# Patient Record
Sex: MALE | Race: WHITE | NOT HISPANIC OR LATINO | Employment: OTHER | URBAN - METROPOLITAN AREA
[De-identification: names, ages, dates, MRNs, and addresses within clinical notes are randomized per-mention and may not be internally consistent; named-entity substitution may affect disease eponyms.]

---

## 2017-09-11 ENCOUNTER — APPOINTMENT (OUTPATIENT)
Dept: GENERAL RADIOLOGY | Facility: HOSPITAL | Age: 77
End: 2017-09-11

## 2017-09-11 ENCOUNTER — APPOINTMENT (OUTPATIENT)
Dept: CT IMAGING | Facility: HOSPITAL | Age: 77
End: 2017-09-11

## 2017-09-11 ENCOUNTER — HOSPITAL ENCOUNTER (INPATIENT)
Facility: HOSPITAL | Age: 77
LOS: 2 days | Discharge: NURSING, REHAB OR LONG-TERM CARE | End: 2017-09-14
Attending: EMERGENCY MEDICINE | Admitting: FAMILY MEDICINE

## 2017-09-11 DIAGNOSIS — R79.1 ELEVATED INR: ICD-10-CM

## 2017-09-11 DIAGNOSIS — N13.30 BILATERAL HYDRONEPHROSIS: ICD-10-CM

## 2017-09-11 DIAGNOSIS — Z74.09 IMPAIRED MOBILITY AND ADLS: ICD-10-CM

## 2017-09-11 DIAGNOSIS — N32.0 BLADDER OUTLET OBSTRUCTION: ICD-10-CM

## 2017-09-11 DIAGNOSIS — Z74.09 IMPAIRED FUNCTIONAL MOBILITY, BALANCE, GAIT, AND ENDURANCE: ICD-10-CM

## 2017-09-11 DIAGNOSIS — R62.7 ADULT FAILURE TO THRIVE SYNDROME: ICD-10-CM

## 2017-09-11 DIAGNOSIS — Z78.9 IMPAIRED MOBILITY AND ADLS: ICD-10-CM

## 2017-09-11 DIAGNOSIS — N17.9 ACUTE RENAL FAILURE, UNSPECIFIED ACUTE RENAL FAILURE TYPE (HCC): Primary | ICD-10-CM

## 2017-09-11 PROBLEM — N13.9 OBSTRUCTIVE UROPATHY: Status: ACTIVE | Noted: 2017-09-11

## 2017-09-11 PROBLEM — D75.839 THROMBOCYTOSIS: Status: ACTIVE | Noted: 2017-09-11

## 2017-09-11 PROBLEM — R70.0 ELEVATED SED RATE: Status: ACTIVE | Noted: 2017-09-11

## 2017-09-11 PROBLEM — R79.89 ELEVATED LFTS: Status: ACTIVE | Noted: 2017-09-11

## 2017-09-11 LAB
ALBUMIN SERPL-MCNC: 3.6 G/DL (ref 3.2–4.8)
ALBUMIN/GLOB SERPL: 0.9 G/DL (ref 1.5–2.5)
ALP SERPL-CCNC: 80 U/L (ref 25–100)
ALT SERPL W P-5'-P-CCNC: 76 U/L (ref 7–40)
ANION GAP SERPL CALCULATED.3IONS-SCNC: 8 MMOL/L (ref 3–11)
AST SERPL-CCNC: 47 U/L (ref 0–33)
BACTERIA UR QL AUTO: ABNORMAL /HPF
BASOPHILS # BLD AUTO: 0.06 10*3/MM3 (ref 0–0.2)
BASOPHILS NFR BLD AUTO: 0.6 % (ref 0–1)
BILIRUB SERPL-MCNC: 0.4 MG/DL (ref 0.3–1.2)
BILIRUB UR QL STRIP: NEGATIVE
BUN BLD-MCNC: 26 MG/DL (ref 9–23)
BUN/CREAT SERPL: 17.3 (ref 7–25)
CALCIUM SPEC-SCNC: 9.6 MG/DL (ref 8.7–10.4)
CHLORIDE SERPL-SCNC: 107 MMOL/L (ref 99–109)
CLARITY UR: CLEAR
CO2 SERPL-SCNC: 27 MMOL/L (ref 20–31)
COLOR UR: YELLOW
CREAT BLD-MCNC: 1.5 MG/DL (ref 0.6–1.3)
CRP SERPL-MCNC: 1.58 MG/DL (ref 0–1)
D-LACTATE SERPL-SCNC: 0.9 MMOL/L (ref 0.5–2)
DEPRECATED RDW RBC AUTO: 48.2 FL (ref 37–54)
DEVELOPER EXPIRATION DATE: NORMAL
DEVELOPER LOT NUMBER: NORMAL
EOSINOPHIL # BLD AUTO: 0.21 10*3/MM3 (ref 0–0.3)
EOSINOPHIL NFR BLD AUTO: 2.2 % (ref 0–3)
ERYTHROCYTE [DISTWIDTH] IN BLOOD BY AUTOMATED COUNT: 14.3 % (ref 11.3–14.5)
ERYTHROCYTE [SEDIMENTATION RATE] IN BLOOD: 76 MM/HR (ref 0–20)
EXPIRATION DATE: NORMAL
FECAL OCCULT BLOOD SCREEN, POC: NEGATIVE
GFR SERPL CREATININE-BSD FRML MDRD: 46 ML/MIN/1.73
GLOBULIN UR ELPH-MCNC: 4.1 GM/DL
GLUCOSE BLD-MCNC: 111 MG/DL (ref 70–100)
GLUCOSE UR STRIP-MCNC: NEGATIVE MG/DL
HCT VFR BLD AUTO: 43.6 % (ref 38.9–50.9)
HGB BLD-MCNC: 14.3 G/DL (ref 13.1–17.5)
HGB UR QL STRIP.AUTO: ABNORMAL
HYALINE CASTS UR QL AUTO: ABNORMAL /LPF
IMM GRANULOCYTES # BLD: 0.1 10*3/MM3 (ref 0–0.03)
IMM GRANULOCYTES NFR BLD: 1.1 % (ref 0–0.6)
INR PPP: 5.22
KETONES UR QL STRIP: NEGATIVE
LEUKOCYTE ESTERASE UR QL STRIP.AUTO: ABNORMAL
LYMPHOCYTES # BLD AUTO: 1.63 10*3/MM3 (ref 0.6–4.8)
LYMPHOCYTES NFR BLD AUTO: 17.5 % (ref 24–44)
Lab: NORMAL
MAGNESIUM SERPL-MCNC: 2.3 MG/DL (ref 1.3–2.7)
MCH RBC QN AUTO: 30 PG (ref 27–31)
MCHC RBC AUTO-ENTMCNC: 32.8 G/DL (ref 32–36)
MCV RBC AUTO: 91.4 FL (ref 80–99)
MONOCYTES # BLD AUTO: 0.87 10*3/MM3 (ref 0–1)
MONOCYTES NFR BLD AUTO: 9.3 % (ref 0–12)
NEGATIVE CONTROL: NEGATIVE
NEUTROPHILS # BLD AUTO: 6.47 10*3/MM3 (ref 1.5–8.3)
NEUTROPHILS NFR BLD AUTO: 69.3 % (ref 41–71)
NITRITE UR QL STRIP: NEGATIVE
PH UR STRIP.AUTO: 6.5 [PH] (ref 5–8)
PLATELET # BLD AUTO: 491 10*3/MM3 (ref 150–450)
PMV BLD AUTO: 9.9 FL (ref 6–12)
POSITIVE CONTROL: POSITIVE
POTASSIUM BLD-SCNC: 4.2 MMOL/L (ref 3.5–5.5)
PROCALCITONIN SERPL-MCNC: <0.05 NG/ML
PROT SERPL-MCNC: 7.7 G/DL (ref 5.7–8.2)
PROT UR QL STRIP: NEGATIVE
PROTHROMBIN TIME: 59.9 SECONDS (ref 9.6–11.5)
RBC # BLD AUTO: 4.77 10*6/MM3 (ref 4.2–5.76)
RBC # UR: ABNORMAL /HPF
REF LAB TEST METHOD: ABNORMAL
SODIUM BLD-SCNC: 142 MMOL/L (ref 132–146)
SP GR UR STRIP: 1.01 (ref 1–1.03)
SQUAMOUS #/AREA URNS HPF: ABNORMAL /HPF
UROBILINOGEN UR QL STRIP: ABNORMAL
WBC NRBC COR # BLD: 9.34 10*3/MM3 (ref 3.5–10.8)
WBC UR QL AUTO: ABNORMAL /HPF

## 2017-09-11 PROCEDURE — G0378 HOSPITAL OBSERVATION PER HR: HCPCS

## 2017-09-11 PROCEDURE — 84145 PROCALCITONIN (PCT): CPT | Performed by: EMERGENCY MEDICINE

## 2017-09-11 PROCEDURE — 93005 ELECTROCARDIOGRAM TRACING: CPT | Performed by: EMERGENCY MEDICINE

## 2017-09-11 PROCEDURE — 99219 PR INITIAL OBSERVATION CARE/DAY 50 MINUTES: CPT | Performed by: NURSE PRACTITIONER

## 2017-09-11 PROCEDURE — 83605 ASSAY OF LACTIC ACID: CPT | Performed by: EMERGENCY MEDICINE

## 2017-09-11 PROCEDURE — 25010000002 MORPHINE PER 10 MG: Performed by: FAMILY MEDICINE

## 2017-09-11 PROCEDURE — 25010000002 VITAMIN K1 1 MG/1 ML SOLUTION: Performed by: NURSE PRACTITIONER

## 2017-09-11 PROCEDURE — 83735 ASSAY OF MAGNESIUM: CPT | Performed by: EMERGENCY MEDICINE

## 2017-09-11 PROCEDURE — 71010 HC CHEST PA OR AP: CPT

## 2017-09-11 PROCEDURE — 87040 BLOOD CULTURE FOR BACTERIA: CPT | Performed by: EMERGENCY MEDICINE

## 2017-09-11 PROCEDURE — 25010000002 HYDROMORPHONE PER 4 MG: Performed by: EMERGENCY MEDICINE

## 2017-09-11 PROCEDURE — 74176 CT ABD & PELVIS W/O CONTRAST: CPT

## 2017-09-11 PROCEDURE — 81001 URINALYSIS AUTO W/SCOPE: CPT | Performed by: EMERGENCY MEDICINE

## 2017-09-11 PROCEDURE — 85610 PROTHROMBIN TIME: CPT | Performed by: EMERGENCY MEDICINE

## 2017-09-11 PROCEDURE — 99284 EMERGENCY DEPT VISIT MOD MDM: CPT

## 2017-09-11 PROCEDURE — 80053 COMPREHEN METABOLIC PANEL: CPT | Performed by: EMERGENCY MEDICINE

## 2017-09-11 PROCEDURE — 25010000003 CEFTRIAXONE PER 250 MG: Performed by: NURSE PRACTITIONER

## 2017-09-11 PROCEDURE — 86140 C-REACTIVE PROTEIN: CPT | Performed by: EMERGENCY MEDICINE

## 2017-09-11 PROCEDURE — 85025 COMPLETE CBC W/AUTO DIFF WBC: CPT | Performed by: EMERGENCY MEDICINE

## 2017-09-11 PROCEDURE — 85652 RBC SED RATE AUTOMATED: CPT | Performed by: EMERGENCY MEDICINE

## 2017-09-11 PROCEDURE — 87086 URINE CULTURE/COLONY COUNT: CPT | Performed by: EMERGENCY MEDICINE

## 2017-09-11 RX ORDER — TAMSULOSIN HYDROCHLORIDE 0.4 MG/1
0.4 CAPSULE ORAL NIGHTLY
COMMUNITY

## 2017-09-11 RX ORDER — TAMSULOSIN HYDROCHLORIDE 0.4 MG/1
0.4 CAPSULE ORAL NIGHTLY
Status: DISCONTINUED | OUTPATIENT
Start: 2017-09-11 | End: 2017-09-14 | Stop reason: HOSPADM

## 2017-09-11 RX ORDER — SODIUM CHLORIDE 0.9 % (FLUSH) 0.9 %
1-10 SYRINGE (ML) INJECTION AS NEEDED
Status: DISCONTINUED | OUTPATIENT
Start: 2017-09-11 | End: 2017-09-14 | Stop reason: HOSPADM

## 2017-09-11 RX ORDER — CEFTRIAXONE SODIUM 2 G/50ML
2 INJECTION, SOLUTION INTRAVENOUS EVERY 24 HOURS
Status: DISCONTINUED | OUTPATIENT
Start: 2017-09-11 | End: 2017-09-14 | Stop reason: HOSPADM

## 2017-09-11 RX ORDER — ONDANSETRON 2 MG/ML
4 INJECTION INTRAMUSCULAR; INTRAVENOUS EVERY 6 HOURS PRN
Status: DISCONTINUED | OUTPATIENT
Start: 2017-09-11 | End: 2017-09-14 | Stop reason: HOSPADM

## 2017-09-11 RX ORDER — MORPHINE SULFATE 4 MG/ML
4 INJECTION, SOLUTION INTRAMUSCULAR; INTRAVENOUS EVERY 4 HOURS PRN
Status: DISPENSED | OUTPATIENT
Start: 2017-09-11 | End: 2017-09-12

## 2017-09-11 RX ORDER — ONDANSETRON 4 MG/1
4 TABLET, FILM COATED ORAL EVERY 6 HOURS PRN
Status: DISCONTINUED | OUTPATIENT
Start: 2017-09-11 | End: 2017-09-14 | Stop reason: HOSPADM

## 2017-09-11 RX ORDER — SODIUM CHLORIDE 9 MG/ML
125 INJECTION, SOLUTION INTRAVENOUS CONTINUOUS
Status: DISCONTINUED | OUTPATIENT
Start: 2017-09-11 | End: 2017-09-13

## 2017-09-11 RX ORDER — HYDROMORPHONE HYDROCHLORIDE 1 MG/ML
0.5 INJECTION, SOLUTION INTRAMUSCULAR; INTRAVENOUS; SUBCUTANEOUS ONCE
Status: COMPLETED | OUTPATIENT
Start: 2017-09-11 | End: 2017-09-11

## 2017-09-11 RX ORDER — SODIUM CHLORIDE 0.9 % (FLUSH) 0.9 %
10 SYRINGE (ML) INJECTION AS NEEDED
Status: DISCONTINUED | OUTPATIENT
Start: 2017-09-11 | End: 2017-09-14 | Stop reason: HOSPADM

## 2017-09-11 RX ORDER — WARFARIN SODIUM 4 MG/1
4 TABLET ORAL DAILY
COMMUNITY

## 2017-09-11 RX ORDER — PHYTONADIONE 2 MG/ML
5 INJECTION, EMULSION INTRAMUSCULAR; INTRAVENOUS; SUBCUTANEOUS ONCE
Status: COMPLETED | OUTPATIENT
Start: 2017-09-11 | End: 2017-09-11

## 2017-09-11 RX ADMIN — PHYTONADIONE 5 MG: 1 INJECTION, EMULSION INTRAMUSCULAR; INTRAVENOUS; SUBCUTANEOUS at 20:38

## 2017-09-11 RX ADMIN — SODIUM CHLORIDE 125 ML/HR: 9 INJECTION, SOLUTION INTRAVENOUS at 13:29

## 2017-09-11 RX ADMIN — CEFTRIAXONE SODIUM 2 G: 2 INJECTION, SOLUTION INTRAVENOUS at 21:44

## 2017-09-11 RX ADMIN — SODIUM CHLORIDE 500 ML: 9 INJECTION, SOLUTION INTRAVENOUS at 12:24

## 2017-09-11 RX ADMIN — LIDOCAINE HYDROCHLORIDE: 20 JELLY TOPICAL at 17:30

## 2017-09-11 RX ADMIN — MORPHINE SULFATE 4 MG: 4 INJECTION, SOLUTION INTRAMUSCULAR; INTRAVENOUS at 23:11

## 2017-09-11 RX ADMIN — HYDROMORPHONE HYDROCHLORIDE 0.5 MG: 1 INJECTION, SOLUTION INTRAMUSCULAR; INTRAVENOUS; SUBCUTANEOUS at 17:18

## 2017-09-11 RX ADMIN — TAMSULOSIN HYDROCHLORIDE 0.4 MG: 0.4 CAPSULE ORAL at 21:44

## 2017-09-11 NOTE — ED PROVIDER NOTES
Subjective   HPI Comments: Arthur Tran is a 77 y.o.male who is a poor ambulator with difficulty waking. He has a chronic sore on his foot which he sees a podiatrist for regularly. He uses a buggy if he goes out anywhere. He has history of prostatitis, enlarged prostate, epididymidis, UTIs, pulmonary embolus, and hypertension. He is on coumadin as a result of his previous blood clots. He presents to the ED with c/o rectal drainage which onset 4 days ago. He reports he was seen by his PCP, Dr. Gomez about one week ago with the complaint of dark odorous urine. He was prescribed Cipro 500 mg BID and is currently on day 6 of the course. He notes his symptoms have improved with the treatment. However, he noticed he was passing purulent drainage from his rectum 4 days ago. The drainage has remained pretty persistent since. He denies experiencing any pain during bowel movements. He did a home enema without any improvement in his symptoms. He has had chills and pain when attempting to start urination but he denies fevers, bloody stool, hematuria, testicular pain, or any other complaints at this time. His last colonoscopy was 5 years ago. He reports a few nodules were removed as a result, otherwise it was negative for findings.               Patient is a 77 y.o. male presenting with general illness.   History provided by:  Patient  Illness   Location:  Rectum  Quality:  Purulent drainage   Severity:  Moderate  Onset quality:  Sudden  Duration:  4 days  Timing:  Constant  Progression:  Unchanged  Chronicity:  New  Context:  He reports he was seen by his PCP, Dr. Gomez about one week ago with the complaint of dark odorous urine. He was prescribed Cipro 500 mg BID and is currently on day 6 of the course. He notes his symptoms have improved with the treatment. However, he noticed he was passing purulent drainage from his rectum 4 days ago. The drainage has remained pretty persistent since.  Relieved by:  Nothing  Worsened  by:  Nothing  Ineffective treatments:  Home enema   Associated symptoms: no diarrhea and no fever        Review of Systems   Constitutional: Positive for chills. Negative for fever.   Gastrointestinal: Negative for anal bleeding, blood in stool, constipation, diarrhea and rectal pain.        Purulent drainage from his rectum    Genitourinary: Positive for dysuria (when attempting to start urination ). Negative for hematuria and testicular pain.        Resolved dark odorous urine.    All other systems reviewed and are negative.      Past Medical History:   Diagnosis Date   • BPH (benign prostatic hyperplasia)    • CHF (congestive heart failure)    • CKD (chronic kidney disease)    • DVT (deep venous thrombosis)    • Hypertension    • PE (pulmonary thromboembolism)    • Prostatitis    11:12 AM  Old records reviewed. He was seen here in February 2016 with folly catheter issues. Treated for a UTI at that time. History of pulmonary embolus, aneurysm, epididymitis, enlarged prostate, and hypertension. Last admitted in 2015 with epididymal orchitis of the left testicle. IVC filter placed in the past.     Allergies   Allergen Reactions   • Bactrim [Sulfamethoxazole-Trimethoprim]    • Tetracyclines & Related        Past Surgical History:   Procedure Laterality Date   • RHINOPLASTY     • TONSILLECTOMY AND ADENOIDECTOMY      as child       Family History   Problem Relation Age of Onset   • Lymphoma Mother    • Kidney disease Father    • Aneurysm Sister    • Asthma Maternal Grandfather        Social History     Social History   • Marital status:      Spouse name: N/A   • Number of children: N/A   • Years of education: N/A     Occupational History   • Retired officer      Social History Main Topics   • Smoking status: Former Smoker   • Smokeless tobacco: None   • Alcohol use No   • Drug use: No   • Sexual activity: Defer     Other Topics Concern   • None     Social History Narrative    Lives alone in [place]    No  assistive devices used    Not active with home health         Objective   Physical Exam   Constitutional: He is oriented to person, place, and time. He appears well-developed and well-nourished. No distress.   HENT:   Head: Normocephalic and atraumatic.   Nose: Nose normal.   Poor dentition.    Eyes: Conjunctivae are normal. No scleral icterus.   Neck: Normal range of motion. Neck supple.   Cardiovascular: Normal rate, regular rhythm and normal heart sounds.    No murmur heard.  Pulmonary/Chest: Effort normal and breath sounds normal. No respiratory distress.   Abdominal: Soft. Bowel sounds are normal. He exhibits no mass. There is no tenderness. There is no rebound and no guarding.   Obese. No organomegaly.    Genitourinary: Rectal exam shows guaiac positive stool (trace positive secretions ).   Genitourinary Comments: Testes descended bilaterally. No herniation or tenderness. Uncircumcised. No pus extracted form the meatus. Perineum normal. Kraig purulence coming from the anus, which did not appear to be overtly WBC mediated pus. No evidence of anal fissure or external hemorrhoids that I could see. Slightly decreased rectal tone. 2+ prostate foggy and tender. No stool in the rectal vault. Secretions were mildly guaiac positive.    Musculoskeletal: Normal range of motion. He exhibits no edema.   Upper extremities normal. Lower extremities had compression stockings and fair toe hygiene without breakdown. 2/4 pulses. High arches bilaterally.   Neurological: He is alert and oriented to person, place, and time.   Mild generalized weakness.    Skin: Skin is warm and dry.   Psychiatric: He has a normal mood and affect. His behavior is normal.   Nursing note and vitals reviewed.      Procedures         ED Course  ED Course     Recent Results (from the past 24 hour(s))   Blood Culture    Collection Time: 09/11/17 11:50 AM   Result Value Ref Range    Blood Culture No growth at less than 24 hours    Comprehensive Metabolic  Panel    Collection Time: 09/11/17 11:52 AM   Result Value Ref Range    Glucose 111 (H) 70 - 100 mg/dL    BUN 26 (H) 9 - 23 mg/dL    Creatinine 1.50 (H) 0.60 - 1.30 mg/dL    Sodium 142 132 - 146 mmol/L    Potassium 4.2 3.5 - 5.5 mmol/L    Chloride 107 99 - 109 mmol/L    CO2 27.0 20.0 - 31.0 mmol/L    Calcium 9.6 8.7 - 10.4 mg/dL    Total Protein 7.7 5.7 - 8.2 g/dL    Albumin 3.60 3.20 - 4.80 g/dL    ALT (SGPT) 76 (H) 7 - 40 U/L    AST (SGOT) 47 (H) 0 - 33 U/L    Alkaline Phosphatase 80 25 - 100 U/L    Total Bilirubin 0.4 0.3 - 1.2 mg/dL    eGFR Non African Amer 46 (L) >60 mL/min/1.73    Globulin 4.1 gm/dL    A/G Ratio 0.9 (L) 1.5 - 2.5 g/dL    BUN/Creatinine Ratio 17.3 7.0 - 25.0    Anion Gap 8.0 3.0 - 11.0 mmol/L   Protime-INR    Collection Time: 09/11/17 11:52 AM   Result Value Ref Range    Protime 59.9 (C) 9.6 - 11.5 Seconds    INR 5.22    CBC Auto Differential    Collection Time: 09/11/17 11:52 AM   Result Value Ref Range    WBC 9.34 3.50 - 10.80 10*3/mm3    RBC 4.77 4.20 - 5.76 10*6/mm3    Hemoglobin 14.3 13.1 - 17.5 g/dL    Hematocrit 43.6 38.9 - 50.9 %    MCV 91.4 80.0 - 99.0 fL    MCH 30.0 27.0 - 31.0 pg    MCHC 32.8 32.0 - 36.0 g/dL    RDW 14.3 11.3 - 14.5 %    RDW-SD 48.2 37.0 - 54.0 fl    MPV 9.9 6.0 - 12.0 fL    Platelets 491 (H) 150 - 450 10*3/mm3    Neutrophil % 69.3 41.0 - 71.0 %    Lymphocyte % 17.5 (L) 24.0 - 44.0 %    Monocyte % 9.3 0.0 - 12.0 %    Eosinophil % 2.2 0.0 - 3.0 %    Basophil % 0.6 0.0 - 1.0 %    Immature Grans % 1.1 (H) 0.0 - 0.6 %    Neutrophils, Absolute 6.47 1.50 - 8.30 10*3/mm3    Lymphocytes, Absolute 1.63 0.60 - 4.80 10*3/mm3    Monocytes, Absolute 0.87 0.00 - 1.00 10*3/mm3    Eosinophils, Absolute 0.21 0.00 - 0.30 10*3/mm3    Basophils, Absolute 0.06 0.00 - 0.20 10*3/mm3    Immature Grans, Absolute 0.10 (H) 0.00 - 0.03 10*3/mm3   C-reactive Protein    Collection Time: 09/11/17 11:52 AM   Result Value Ref Range    C-Reactive Protein 1.58 (H) 0.00 - 1.00 mg/dL   Sedimentation  Rate    Collection Time: 09/11/17 11:52 AM   Result Value Ref Range    Sed Rate 76 (H) 0 - 20 mm/hr   Magnesium    Collection Time: 09/11/17 11:52 AM   Result Value Ref Range    Magnesium 2.3 1.3 - 2.7 mg/dL   Procalcitonin    Collection Time: 09/11/17 11:52 AM   Result Value Ref Range    Procalcitonin <0.05 ng/mL   Lactic Acid, Plasma    Collection Time: 09/11/17 11:52 AM   Result Value Ref Range    Lactate 0.9 0.5 - 2.0 mmol/L   Blood Culture    Collection Time: 09/11/17 11:55 AM   Result Value Ref Range    Blood Culture No growth at less than 24 hours    POCT Occult Blood, stool    Collection Time: 09/11/17 11:57 AM   Result Value Ref Range    Fecal Occult Blood Negative Negative    Lot Number [id number]     Expiration Date 5/2020     DEVELOPER LOT NUMBER [id number]     DEVELOPER EXPIRATION DATE 5/2020     Positive Control Positive Positive    Negative Control Negative Negative   Urinalysis With / Culture If Indicated    Collection Time: 09/11/17 12:31 PM   Result Value Ref Range    Color, UA Yellow Yellow, Straw    Appearance, UA Clear Clear    pH, UA 6.5 5.0 - 8.0    Specific Gravity, UA 1.011 1.001 - 1.030    Glucose, UA Negative Negative    Ketones, UA Negative Negative    Bilirubin, UA Negative Negative    Blood, UA Small (1+) (A) Negative    Protein, UA Negative Negative    Leuk Esterase, UA Small (1+) (A) Negative    Nitrite, UA Negative Negative    Urobilinogen, UA 0.2 E.U./dL 0.2 - 1.0 E.U./dL   Urinalysis, Microscopic Only    Collection Time: 09/11/17 12:31 PM   Result Value Ref Range    RBC, UA 3-6 (A) None Seen, 0-2 /HPF    WBC, UA 6-12 (A) None Seen /HPF    Bacteria, UA None Seen None Seen, Trace /HPF    Squamous Epithelial Cells, UA 0-2 None Seen, 0-2 /HPF    Hyaline Casts, UA 0-6 0 - 6 /LPF    Methodology Automated Microscopy    Urine Culture    Collection Time: 09/11/17 12:31 PM   Result Value Ref Range    Urine Culture Culture in progress      Note: In addition to lab results from this visit, the labs  "listed above may include labs taken at another facility or during a different encounter within the last 24 hours. Please correlate lab times with ED admission and discharge times for further clarification of the services performed during this visit.    CT Abdomen Pelvis Without Contrast   Final Result   1.  New moderate obstruction of the kidneys bilaterally as well as the   ureters to the bladder. No obstructing lesion identified with   irregularity identified of the prostate. Bladder outlet obstruction   cannot be excluded.   2. Contrast seen within the rectum with no definite evidence of   extravasation or fistulization. Clinical correlation is needed.   3. Ventral abdominal wall hernia containing mesenteric fat only.       D:  09/11/2017   E:  09/11/2017       This report was finalized on 9/11/2017 4:04 PM by Dr. Inna West MD.          XR Chest 1 View   Final Result   No acute cardiopulmonary disease.       D:  09/11/2017   E:  09/11/2017       This report was finalized on 9/11/2017 4:03 PM by Dr. Inna West MD.            Vitals:    09/11/17 1850 09/12/17 0000 09/12/17 0400 09/12/17 0803   BP: 154/86 133/71 134/71 111/72   BP Location: Right arm Right arm Left arm Right arm   Patient Position: Lying Lying Lying Sitting   Pulse: 76 70 68 87   Resp: 20 18 20 18   Temp: 97.7 °F (36.5 °C) 98.2 °F (36.8 °C) 97.4 °F (36.3 °C) 98.1 °F (36.7 °C)   TempSrc: Oral Oral Oral Oral   SpO2: 95% 96% 95%    Weight: (!) 305 lb 8 oz (139 kg)      Height: 75.5\" (191.8 cm)        Medications   sodium chloride 0.9 % flush 10 mL (not administered)   sodium chloride 0.9 % infusion (125 mL/hr Intravenous Currently Infusing 9/11/17 2155)   tamsulosin (FLOMAX) 24 hr capsule 0.4 mg (0.4 mg Oral Given 9/11/17 2144)   sodium chloride 0.9 % flush 1-10 mL (not administered)   ondansetron (ZOFRAN) tablet 4 mg (not administered)     Or   ondansetron (ZOFRAN) injection 4 mg (not administered)   Pharmacy Consult - Pharmacy to " dose (not administered)   warfarin (COUMADIN) (dosing per levels) ( Does not apply Hold 9/11/17 2037)   cefTRIAXone (ROCEPHIN) IVPB 2 g (2 g Intravenous New Bag 9/11/17 2144)   Morphine sulfate (PF) injection 4 mg (4 mg Intravenous Given 9/12/17 0334)   sodium chloride 0.9 % bolus 500 mL (0 mL Intravenous Stopped 9/11/17 1315)   HYDROmorphone (DILAUDID) injection 0.5 mg (0.5 mg Intravenous Given 9/11/17 1718)   lidocaine (XYLOCAINE) 2 % jelly ( Topical Given 9/11/17 1730)   vitamin K1 (PHYTONADIONE) 1 MG/1 ML oral solution 5 mg (5 mg Oral Given 9/11/17 2038)     ECG/EMG Results (last 24 hours)     Procedure Component Value Units Date/Time    ECG 12 Lead [[id number]] Collected:  09/11/17 1208     Updated:  09/11/17 1217        Procedure attempted Cummins placement.    Nursing staff could not place Cummins catheter.    The patient's uncircumcised.  Patient's penis was prepped and draped in normal sterile fashion and I instilled 10 mL's of 2% lidocaine jelly and his urethra.  This attempted it was sterilely place a 14 French coudé catheter without success.  Patient tolerated procedure well he had no bleeding.    I spoke Dr. Mckinney, on-call urology, he'll see the patient              MDM  Number of Diagnoses or Management Options  Acute renal failure, unspecified acute renal failure type:   Adult failure to thrive syndrome:   Bilateral hydronephrosis:   Elevated INR:   Diagnosis management comments:       I reviewed all available studies at the bedside with the patient.  Anna has acute renal insufficiency with hydronephrosis and bladder outlet obstruction.  Is able to pass a small amount of urine here is post void residual was in excess of 200 mL's.    He feels too weak to go home and his INR is supratherapeutic and I think he is a fall risk.    I've attempted to place a Cummins catheter and the patient was unable to do so.  I've call Dr. Mckinney, urology, to see the patient with Cummins catheter placement.    This point I think  "the best course of action is to admit the patient the hospital for observation to get his renal function improved his INR down to a therapeutic level and see what resources we can get for him at home as it sounds like he has adult failure to thrive as well.  He has children but apparently they're not involved in his life and he lives by himself.  He feels he is losing the ability to care for himself.    The etiology of this \"pus\" that he is passing from his rectum is unclear to me is his rectal contrasted CT shows no abnormality.  He does have some secretions from his rectum but no blood no abscess that I can either see or feel worse and CAT scans of breath just excessive anal glands secretions.    I've a call to Dr. Yusuf, on-call hospital medicine, to admit the patient.    All are agreeable with plan       Amount and/or Complexity of Data Reviewed  Clinical lab tests: reviewed  Tests in the radiology section of CPT®: reviewed  Tests in the medicine section of CPT®: reviewed  Decide to obtain previous medical records or to obtain history from someone other than the patient: yes        Final diagnoses:   Acute renal failure, unspecified acute renal failure type   Bilateral hydronephrosis   Elevated INR   Adult failure to thrive syndrome   Bladder outlet obstruction   EMR Dragon/Transcription disclaimer:   Much of this encounter note is an electronic transcription/translation of spoken language to printed text. The electronic translation of spoken language may permit erroneous, or at times, nonsensical words or phrases to be inadvertently transcribed; Although I have reviewed the note for such errors, some may still exist.       Documentation assistance provided by marcia Johnson.  Information recorded by the marcai was done at my direction and has been verified and validated by me.     Nevin Johnson  09/11/17 1149       Nevin Johnson  09/11/17 1550       León Blount MD  09/12/17 0856    "

## 2017-09-11 NOTE — CONSULTS
Assessment   77 y.o. male with acute on chronic urinary retention due to benign prostatic hypertrophy.  HARJINDER Galvez has been seen by myself and my partner Dr. Boyle in the past for BPH symptoms.  He was previously set up for cystoscopic evaluation did not show well.  He now states a 1 week history of difficulty urinating, urinary straining, voiding small amounts and malodorous urine.  The emergency room was unable to pass a Cummins catheter and urology is consulted for Cummins placement.  CT scan was performed in the emergency room.  It showed a moderately distended bladder and bilateral hydronephrosis which is new. Cr is 1.5, WBC is 9.3.     Plan   I was unable to pass a 16 Norwegian coudé catheter and was able to pass a 4 Norwegian spiral filiform and I then dilated the urethra with serial followers from 10 Norwegian to 20 Norwegian.  A 16 Norwegian coudé catheter was then placed with some resistance around the bulbar urethra.  There was significant resistance to passage of the 16,18 and 20 Norwegian followers.  Patient did tolerate procedure well.  After Cummins was placed 650 cc of concentrated urine was obtained.  Recommend continuing Cummins catheter until patient returns to the office with myself.    Subjective   Arthur Tran is a 77 y.o. male who was admitted for Acute renal failure, unspecified acute renal failure type [N17.9]. Patient was referred by ER for evaluation and treatment of urinary retention that began 1 week ago. Patient currently has a urinary catheter in place.  650 cc mL of urine was drained when catheter was placed. Prior to this event, voiding symptoms consisted of slow stream, hesitancy, intermittency, urgency. Prior treatments include tamulosin. Recent medications that may have affected voiding include none. Patient has not had recent constipation. Patient has had no surgeries.    Patient admits to history of previous retention and no risk factors for cancer. Patient denies history of trauma. Patient has had no  prior workup.    Outside records reviewed. Pertinent radiology and labs reviewed.    Review of Systems  Constitutional: negative  Respiratory: negative  Gastrointestinal: positive for diarrhea  Genitourinary:positive for decreased stream, hesitancy and nocturia  Musculoskeletal:positive for back pain     Objective   Physicial Exam  General appearance: alert, appears stated age and cooperative  Head: Normocephalic, without obvious abnormality, atraumatic  Neck: no JVD, supple, symmetrical, trachea midline and thyroid not enlarged, symmetric, no tenderness/mass/nodules  Back: symmetric, no curvature. ROM normal. No CVA tenderness.  Lungs: clear to auscultation bilaterally  Male genitalia: normal, normal findings: normal uncircumcised penis    Imaging  CT Abdomen: reviewed    Labs  Urine Microscopy:   Results from last 7 days  Lab Units 09/11/17  1231   RBC UA /HPF 3-6*   WBC UA /HPF 6-12*   , BMP:   Results from last 7 days  Lab Units 09/11/17  1152   SODIUM mmol/L 142   POTASSIUM mmol/L 4.2   CALCIUM mg/dL 9.6   CHLORIDE mmol/L 107   CO2 mmol/L 27.0   BUN mg/dL 26*   CREATININE mg/dL 1.50*   ALBUMIN g/dL 3.60   BILIRUBIN mg/dL 0.4   ALK PHOS U/L 80    and CBC:   Results from last 7 days  Lab Units 09/11/17  1152   WBC 10*3/mm3 9.34   RBC 10*6/mm3 4.77   HEMOGLOBIN g/dL 14.3   HEMATOCRIT % 43.6   PLATELETS 10*3/mm3 491*

## 2017-09-11 NOTE — H&P
[facility] Medicine Services  HISTORY AND PHYSICAL    Primary Care Physician: Edgardo Gomez MD    Subjective     Chief Complaint:  UTI/rectal drainage    History of Present Illness:   76 yo male who lives alone presents with rectal drainage for last 4-5 days that patient reports looks like pus.  Currently being treated for UTI with cipro and symptoms of dysuria have improved.  However, urine output has decreased in last several days and he feels run down.        Review of Systems   Constitutional: Positive for activity change and chills. Negative for diaphoresis and fever.   HENT: Negative for hearing loss.    Eyes: Negative for visual disturbance.   Respiratory: Positive for shortness of breath. Negative for cough.    Cardiovascular: Negative for chest pain.   Gastrointestinal: Positive for diarrhea and nausea. Negative for constipation and vomiting.   Genitourinary: Positive for dysuria. Negative for hematuria.   Skin: Positive for wound.   Neurological: Negative for headaches.   Psychiatric/Behavioral: Negative for confusion.        Past Medical History:   Diagnosis Date   • BPH (benign prostatic hyperplasia)    • CHF (congestive heart failure)    • CKD (chronic kidney disease)    • DVT (deep venous thrombosis)    • Hypertension    • PE (pulmonary thromboembolism)    • Prostatitis        Past Surgical History:   Procedure Laterality Date   • RHINOPLASTY     • TONSILLECTOMY AND ADENOIDECTOMY      as child       Family History   Problem Relation Age of Onset   • Lymphoma Mother    • Kidney disease Father    • Aneurysm Sister    • Asthma Maternal Grandfather        Social History     Social History   • Marital status:      Spouse name: N/A   • Number of children: N/A   • Years of education: N/A     Occupational History   • Retired officer      Social History Main Topics   • Smoking status: Former Smoker   • Smokeless tobacco: Not on file   • Alcohol use No   • Drug use: No   •  "Sexual activity: Defer     Other Topics Concern   • Not on file     Social History Narrative    Lives alone in [place]    No assistive devices used    Not active with home health       Medications:  Prescriptions Prior to Admission   Medication Sig Dispense Refill Last Dose   • BuPROPion HCl (WELLBUTRIN PO) Take  by mouth 2 (Two) Times a Day.      • LISINOPRIL PO Take  by mouth.      • tamsulosin (FLOMAX) 0.4 MG capsule 24 hr capsule Take 1 capsule by mouth Every Night.      • warfarin (COUMADIN) 4 MG tablet Take 4 mg by mouth Daily.          Allergies:  Allergies   Allergen Reactions   • Bactrim [Sulfamethoxazole-Trimethoprim]    • Tetracyclines & Related          Objective     Physical Exam:  Vital Signs: /80  Pulse 72  Temp 98.4 °F (36.9 °C) (Oral)   Resp 18  Ht 77\" (195.6 cm)  Wt (!) 317 lb (144 kg)  SpO2 96%  BMI 37.59 kg/m2  Physical Exam   Constitutional: He is oriented to person, place, and time. He appears well-developed and well-nourished. No distress.   HENT:   Head: Normocephalic.   POOR DENTITION   Eyes: No scleral icterus.   Neck: Neck supple.   Cardiovascular: Normal rate and regular rhythm.  Exam reveals no gallop and no friction rub.    No murmur heard.  Pulmonary/Chest: Effort normal and breath sounds normal. No respiratory distress. He has no wheezes.   Abdominal: Soft. Bowel sounds are normal. He exhibits distension. There is no tenderness. There is no guarding.   Musculoskeletal: He exhibits no edema.   Neurological: He is alert and oriented to person, place, and time.   Skin: Skin is warm and dry.   Psychiatric: He has a normal mood and affect.     Results Reviewed:    Results from last 7 days  Lab Units 09/11/17  1152   WBC 10*3/mm3 9.34   HEMOGLOBIN g/dL 14.3   PLATELETS 10*3/mm3 491*       Results from last 7 days  Lab Units 09/11/17  1152   SODIUM mmol/L 142   POTASSIUM mmol/L 4.2   CO2 mmol/L 27.0   CREATININE mg/dL 1.50*   GLUCOSE mg/dL 111*   CALCIUM mg/dL 9.6     Imaging " Results (last 24 hours)     Procedure Component Value Units Date/Time    XR Chest 1 View [[id number]] Collected:  09/11/17 1327     Updated:  09/11/17 1605    Narrative:       EXAMINATION: XR CHEST 1 VW- 09/11/2017     INDICATION: chills      COMPARISON: NONE     FINDINGS: Portable chest reveals heart to be enlarged. Calcified  granuloma identified left lung base. Underlying chronic changes seen  within the lung fields bilaterally. Degenerative changes seen within the  spine. Vascular calcifications identified.           Impression:       No acute cardiopulmonary disease.     D:  09/11/2017  E:  09/11/2017     This report was finalized on 9/11/2017 4:03 PM by Dr. Inna West MD.       CT Abdomen Pelvis Without Contrast [[id number]] Collected:  09/11/17 1533     Updated:  09/11/17 1606    Narrative:       EXAMINATION: CT ABDOMEN PELVIS WO CONTRAST- 09/11/2017     INDICATION: Rule out fistula     TECHNIQUE: Multiple axial CT imaging was obtained of the abdomen and  pelvis from the lung bases through the pubic symphysis following the  administration of rectal contrast.     The radiation dose reduction device was turned on for each scan per the  ALARA (As Low as Reasonably Achievable) protocol.     COMPARISON: 01/24/2016     FINDINGS:      ABDOMEN: The lung bases are grossly clear. Calcified granuloma  identified within the right lower lobe. Liver is homogeneous in  appearance with no stones seen in the gallbladder. Moderate dilatation  identified of the renal collecting systems bilaterally as well as both  ureters. There is mild distention of the bladder. Irregularity  identified within the prostate. Bladder outlet obstruction cannot be  completely excluded. Clinical correlation is needed. There is rectal  contrast seen within the rectum. No abnormal mass or fluid collections  identified. No definite fistula identified. There is some air seen in  the rectal vault. Spleen is unremarkable. The distal colon  is  unremarkable. Vascular calcifications seen within the abdominal aorta  and iliac vessels. Small infrarenal abdominal aortic aneurysm stable and  unchanged. No free fluid or free air.     PELVIS: The pelvic organs are unremarkable. The pelvic portion of the  gastrointestinal tract again reveal mild heterogeneous enlargement of  the prostate with some calcification. There is distal ureteral  distention. Small ventral abdominal wall hernia containing mesenteric  fat only. No free fluid or free air. No abnormal mass or fluid  collections identified. The bony structures are unremarkable.       Impression:       1.  New moderate obstruction of the kidneys bilaterally as well as the  ureters to the bladder. No obstructing lesion identified with  irregularity identified of the prostate. Bladder outlet obstruction  cannot be excluded.  2. Contrast seen within the rectum with no definite evidence of  extravasation or fistulization. Clinical correlation is needed.  3. Ventral abdominal wall hernia containing mesenteric fat only.     D:  09/11/2017  E:  09/11/2017     This report was finalized on 9/11/2017 4:04 PM by Dr. Inna West MD.               Assessment / Plan     Problem List:   Hospital Problem List     * (Principal)Acute renal failure    Obstructive uropathy    Supratherapeutic INR    DVT (deep venous thrombosis)    PE (pulmonary thromboembolism)    Hypertension    CKD (chronic kidney disease)    BPH (benign prostatic hyperplasia)    Thrombocytosis    Elevated sed rate    Elevated LFTs          Plan:  --urology consult   --vitamin k for INR  --IVF  --labs in am  --rocephin for presumed prostatitis and to complete UTI treatment..was on cipro as outpatient      DVT prophylaxis:  Mechanical only, over-anticoagulated currently  Code Status: conditional   Admission Status: Patient will be admitted to (LEXOBS or LEXINPT)     TROY Bey 09/11/17 7:00 PM      Brief Attending Admission Note     I have seen  and examined the patient, performing an independent face-to-face diagnostic evaluation with plan of care reviewed and developed with the advanced practice clinician TROY Schmitt.      Brief Summary Statement/HPI:   Mr. 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  He was started on cipro by urology a few days ago and that seemed to help the urine odor but his retention worsened until finally he came to the ER unable to pass any urine at all.  The staff in the ER was unable to pass a navarro catheter so Dr. Mckinney came in and did serial dilations and passed a #16 coude.  His INR was also noted to be supratherapeutic at the time of ER evaluation at 5.22.  Over the last week, the patient has had the urinary symptoms, rectal pain that he associates with his prostate, and generalized weakness.        Attending Physical Exam:  Constitutional: no acute distress, awake, alert  Eyes: PERRLA, sclerae anicteric, no conjunctival injection  HENT: NCAT, mucous membranes moist, poor dentition  Neck: supple, no thyromegaly, no lymphadenopathy, trachea midline  Respiratory: Clear to auscultation bilaterally, nonlabored respirations   Cardiovascular: RRR, palpable pedal pulses bilaterally  Gastrointestinal: Positive bowel sounds, soft, obese  Musculoskeletal: No bilateral ankle edema, no clubbing or cyanosis to bilateral lower extremities  Psychiatric: oriented x 3, appropriate affect, cooperative  Neurologic: Strength symmetric in all extremities, Cranial Nerves grossly intact to confrontation, speech clear  Derm: no rashes    Brief Assessment/Plan :  See above for further detailed assessment and plan developed with APC which I have reviewed and/or edited.    DANIELITO/CKD due to post obstructive uropathy:  --Obstruction relieved by Dr. Mckinney,  navarro in.  Should go home with navarro until follows up with uro.  --Recheck in AM  --Avoid further nephrotoxins    Supratherapeutic INR:  --Bloody urine  --5mg vitamin K given, recheck INR in AM  --Pharmacy to dose warfarin    Possible UTI, partially treated:  --D/C cipro due to DANIELITO  --Rocephin 1GM IV daily  --Await cultures    I believe this patient meets LEXINPT: DANIELITO due to obstructive uropathy requiring specialty consultation, further monitoring of renal function.  Complicated by possible UTI requiring IV antibiotics.      Candice Yusuf MD  09/12/17  12:00 AM

## 2017-09-12 PROBLEM — N41.9 PROSTATITIS: Status: ACTIVE | Noted: 2017-09-12

## 2017-09-12 PROBLEM — N30.01 ACUTE CYSTITIS WITH HEMATURIA: Status: ACTIVE | Noted: 2017-09-12

## 2017-09-12 LAB
ANION GAP SERPL CALCULATED.3IONS-SCNC: 2 MMOL/L (ref 3–11)
BASOPHILS # BLD AUTO: 0.07 10*3/MM3 (ref 0–0.2)
BASOPHILS NFR BLD AUTO: 0.7 % (ref 0–1)
BUN BLD-MCNC: 26 MG/DL (ref 9–23)
BUN/CREAT SERPL: 17.3 (ref 7–25)
CALCIUM SPEC-SCNC: 8.4 MG/DL (ref 8.7–10.4)
CHLORIDE SERPL-SCNC: 108 MMOL/L (ref 99–109)
CO2 SERPL-SCNC: 31 MMOL/L (ref 20–31)
CREAT BLD-MCNC: 1.5 MG/DL (ref 0.6–1.3)
DEPRECATED RDW RBC AUTO: 48.7 FL (ref 37–54)
EOSINOPHIL # BLD AUTO: 0.23 10*3/MM3 (ref 0–0.3)
EOSINOPHIL NFR BLD AUTO: 2.3 % (ref 0–3)
ERYTHROCYTE [DISTWIDTH] IN BLOOD BY AUTOMATED COUNT: 14.4 % (ref 11.3–14.5)
GFR SERPL CREATININE-BSD FRML MDRD: 46 ML/MIN/1.73
GLUCOSE BLD-MCNC: 116 MG/DL (ref 70–100)
HBA1C MFR BLD: 5.3 % (ref 4.8–5.6)
HCT VFR BLD AUTO: 39.8 % (ref 38.9–50.9)
HGB BLD-MCNC: 12.8 G/DL (ref 13.1–17.5)
IMM GRANULOCYTES # BLD: 0.08 10*3/MM3 (ref 0–0.03)
IMM GRANULOCYTES NFR BLD: 0.8 % (ref 0–0.6)
INR PPP: 1.83
LYMPHOCYTES # BLD AUTO: 1.33 10*3/MM3 (ref 0.6–4.8)
LYMPHOCYTES NFR BLD AUTO: 13.3 % (ref 24–44)
MCH RBC QN AUTO: 29.4 PG (ref 27–31)
MCHC RBC AUTO-ENTMCNC: 32.2 G/DL (ref 32–36)
MCV RBC AUTO: 91.5 FL (ref 80–99)
MONOCYTES # BLD AUTO: 0.96 10*3/MM3 (ref 0–1)
MONOCYTES NFR BLD AUTO: 9.6 % (ref 0–12)
NEUTROPHILS # BLD AUTO: 7.36 10*3/MM3 (ref 1.5–8.3)
NEUTROPHILS NFR BLD AUTO: 73.3 % (ref 41–71)
PLATELET # BLD AUTO: 444 10*3/MM3 (ref 150–450)
PMV BLD AUTO: 9.6 FL (ref 6–12)
POTASSIUM BLD-SCNC: 3.9 MMOL/L (ref 3.5–5.5)
PROTHROMBIN TIME: 20.3 SECONDS (ref 9.6–11.5)
RBC # BLD AUTO: 4.35 10*6/MM3 (ref 4.2–5.76)
SODIUM BLD-SCNC: 141 MMOL/L (ref 132–146)
WBC NRBC COR # BLD: 10.03 10*3/MM3 (ref 3.5–10.8)

## 2017-09-12 PROCEDURE — 80048 BASIC METABOLIC PNL TOTAL CA: CPT | Performed by: NURSE PRACTITIONER

## 2017-09-12 PROCEDURE — 85610 PROTHROMBIN TIME: CPT | Performed by: NURSE PRACTITIONER

## 2017-09-12 PROCEDURE — 83036 HEMOGLOBIN GLYCOSYLATED A1C: CPT | Performed by: NURSE PRACTITIONER

## 2017-09-12 PROCEDURE — 25010000003 CEFTRIAXONE PER 250 MG: Performed by: NURSE PRACTITIONER

## 2017-09-12 PROCEDURE — 25010000002 MORPHINE PER 10 MG: Performed by: FAMILY MEDICINE

## 2017-09-12 PROCEDURE — 85025 COMPLETE CBC W/AUTO DIFF WBC: CPT | Performed by: NURSE PRACTITIONER

## 2017-09-12 RX ORDER — OXYBUTYNIN CHLORIDE 5 MG/1
5 TABLET ORAL 2 TIMES DAILY
COMMUNITY

## 2017-09-12 RX ORDER — FUROSEMIDE 40 MG/1
40 TABLET ORAL DAILY
COMMUNITY

## 2017-09-12 RX ORDER — BUPROPION HYDROCHLORIDE 150 MG/1
150 TABLET, EXTENDED RELEASE ORAL 2 TIMES DAILY
COMMUNITY

## 2017-09-12 RX ORDER — POTASSIUM CHLORIDE 600 MG/1
8 CAPSULE, EXTENDED RELEASE ORAL 2 TIMES DAILY WITH MEALS
COMMUNITY

## 2017-09-12 RX ORDER — LISINOPRIL 10 MG/1
10 TABLET ORAL DAILY
COMMUNITY

## 2017-09-12 RX ORDER — FINASTERIDE 5 MG/1
5 TABLET, FILM COATED ORAL DAILY
COMMUNITY

## 2017-09-12 RX ORDER — BUPROPION HYDROCHLORIDE 150 MG/1
150 TABLET, EXTENDED RELEASE ORAL EVERY 12 HOURS SCHEDULED
Status: DISCONTINUED | OUTPATIENT
Start: 2017-09-12 | End: 2017-09-14 | Stop reason: HOSPADM

## 2017-09-12 RX ORDER — OXYCODONE AND ACETAMINOPHEN 7.5; 325 MG/1; MG/1
1 TABLET ORAL EVERY 4 HOURS PRN
Status: DISCONTINUED | OUTPATIENT
Start: 2017-09-12 | End: 2017-09-13

## 2017-09-12 RX ADMIN — MORPHINE SULFATE 4 MG: 4 INJECTION, SOLUTION INTRAMUSCULAR; INTRAVENOUS at 10:19

## 2017-09-12 RX ADMIN — CEFTRIAXONE SODIUM 2 G: 2 INJECTION, SOLUTION INTRAVENOUS at 20:48

## 2017-09-12 RX ADMIN — BUPROPION HYDROCHLORIDE 150 MG: 150 TABLET, EXTENDED RELEASE ORAL at 23:06

## 2017-09-12 RX ADMIN — OXYCODONE HYDROCHLORIDE AND ACETAMINOPHEN 1 TABLET: 7.5; 325 TABLET ORAL at 20:48

## 2017-09-12 RX ADMIN — LIDOCAINE HYDROCHLORIDE: 20 JELLY TOPICAL at 13:48

## 2017-09-12 RX ADMIN — TAMSULOSIN HYDROCHLORIDE 0.4 MG: 0.4 CAPSULE ORAL at 20:48

## 2017-09-12 RX ADMIN — MORPHINE SULFATE 4 MG: 4 INJECTION, SOLUTION INTRAMUSCULAR; INTRAVENOUS at 03:34

## 2017-09-12 RX ADMIN — OXYCODONE HYDROCHLORIDE AND ACETAMINOPHEN 1 TABLET: 7.5; 325 TABLET ORAL at 13:47

## 2017-09-12 NOTE — PLAN OF CARE
Problem: Patient Care Overview (Adult)  Goal: Plan of Care Review  Outcome: Ongoing (interventions implemented as appropriate)    09/12/17 8521   Coping/Psychosocial Response Interventions   Plan Of Care Reviewed With patient   Patient Care Overview   Progress progress toward functional goals as expected

## 2017-09-12 NOTE — PROGRESS NOTES
"Daily Progress Note    Patient: [facility] Day: 0    Subjective: Pt with some discomfort from [employer].  Urine clear.  Cr 1.5.  CT with evidence of MOJICA.       Objective:     /56 (BP Location: Right arm, Patient Position: Lying)  Pulse 73  Temp 97.8 °F (36.6 °C) (Oral)   Resp 18  Ht 75.5\" (191.8 cm)  Wt (!) 305 lb 8 oz (139 kg)  SpO2 95%  BMI 37.68 kg/m2      Intake/Output Summary (Last 24 hours) at 09/12/17 1505  Last data filed at 09/12/17 1300   Gross per 24 hour   Intake              480 ml   Output             3625 ml   Net            -3145 ml       Review of Systems:    Physical Exam:   General Appearance: alert, appears stated age and cooperative  Head: normocephalic, without obvious abnormality and atraumatic  Lungs respirations regular  Abdomen no masses and soft non-tender      Extremities moves extremities well, no edema, no cyanosis and no redness      Lab Results (last 24 hours)     Procedure Component Value Units Date/Time    Urine Culture [[id number]]  (Normal) Collected:  09/11/17 1231    Specimen:  Urine from Urine, Clean Catch Updated:  09/12/17 0729     Urine Culture Culture in progress    CBC Auto Differential [[id number]]  (Abnormal) Collected:  09/12/17 0941    Specimen:  Blood Updated:  09/12/17 1002     WBC 10.03 10*3/mm3      RBC 4.35 10*6/mm3      Hemoglobin 12.8 (L) g/dL      Hematocrit 39.8 %      MCV 91.5 fL      MCH 29.4 pg      MCHC 32.2 g/dL      RDW 14.4 %      RDW-SD 48.7 fl      MPV 9.6 fL      Platelets 444 10*3/mm3      Neutrophil % 73.3 (H) %      Lymphocyte % 13.3 (L) %      Monocyte % 9.6 %      Eosinophil % 2.3 %      Basophil % 0.7 %      Immature Grans % 0.8 (H) %      Neutrophils, Absolute 7.36 10*3/mm3      Lymphocytes, Absolute 1.33 10*3/mm3      Monocytes, Absolute 0.96 10*3/mm3      Eosinophils, Absolute 0.23 10*3/mm3      Basophils, Absolute 0.07 10*3/mm3      Immature Grans, Absolute 0.08 (H) 10*3/mm3     Basic Metabolic Panel [[id number]]  " (Abnormal) Collected:  09/12/17 0941    Specimen:  Blood Updated:  09/12/17 1031     Glucose 116 (H) mg/dL      BUN 26 (H) mg/dL      Creatinine 1.50 (H) mg/dL      Sodium 141 mmol/L      Potassium 3.9 mmol/L      Chloride 108 mmol/L      CO2 31.0 mmol/L      Calcium 8.4 (L) mg/dL      eGFR Non African Amer 46 (L) mL/min/1.73      BUN/Creatinine Ratio 17.3     Anion Gap 2.0 (L) mmol/L     Narrative:       National Kidney Foundation Guidelines    Stage     Description        GFR  1         Normal or High     90+  2         Mild decrease      60-89  3         Moderate decrease  30-59  4         Severe decrease    15-29  5         Kidney failure     <15    Hemoglobin A1c [[id number]]  (Normal) Collected:  09/12/17 0941    Specimen:  Blood Updated:  09/12/17 1044     Hemoglobin A1C 5.30 %     Narrative:       The American Diabetes Association recommends maintenance of Hemoglobin A1C at 7.0% or lower. Goals for Hemoglobin A1C reduction may need to be modified if hypoglycemia is a problem.    Protime-INR [[id number]]  (Abnormal) Collected:  09/12/17 0941    Specimen:  Blood Updated:  09/12/17 1104     Protime 20.3 (H) Seconds       Verified by repeat analysis.         INR 1.83    Narrative:       Therapeutic Ranges for INR: 2.0-3.0 (PT 20-30)                              2.5-3.5 (PT 25-34)    Blood Culture [[id number]]  (Normal) Collected:  09/11/17 1155    Specimen:  Blood from Arm, Right Updated:  09/12/17 1301     Blood Culture No growth at 24 hours    Blood Culture [[id number]]  (Normal) Collected:  09/11/17 1150    Specimen:  Blood from Arm, Right Updated:  09/12/17 1301     Blood Culture No growth at 24 hours          Imaging Results (last 24 hours)     Procedure Component Value Units Date/Time    XR Chest 1 View [[id number]] Collected:  09/11/17 1327     Updated:  09/11/17 1608    Narrative:       EXAMINATION: XR CHEST 1 VW- 09/11/2017     INDICATION: chills      COMPARISON: NONE     FINDINGS: Portable chest reveals heart  to be enlarged. Calcified  granuloma identified left lung base. Underlying chronic changes seen  within the lung fields bilaterally. Degenerative changes seen within the  spine. Vascular calcifications identified.           Impression:       No acute cardiopulmonary disease.     D:  09/11/2017  E:  09/11/2017     This report was finalized on 9/11/2017 4:03 PM by Dr. Inna West MD.       CT Abdomen Pelvis Without Contrast [[id number]] Collected:  09/11/17 1533     Updated:  09/11/17 1606    Narrative:       EXAMINATION: CT ABDOMEN PELVIS WO CONTRAST- 09/11/2017     INDICATION: Rule out fistula     TECHNIQUE: Multiple axial CT imaging was obtained of the abdomen and  pelvis from the lung bases through the pubic symphysis following the  administration of rectal contrast.     The radiation dose reduction device was turned on for each scan per the  ALARA (As Low as Reasonably Achievable) protocol.     COMPARISON: 01/24/2016     FINDINGS:      ABDOMEN: The lung bases are grossly clear. Calcified granuloma  identified within the right lower lobe. Liver is homogeneous in  appearance with no stones seen in the gallbladder. Moderate dilatation  identified of the renal collecting systems bilaterally as well as both  ureters. There is mild distention of the bladder. Irregularity  identified within the prostate. Bladder outlet obstruction cannot be  completely excluded. Clinical correlation is needed. There is rectal  contrast seen within the rectum. No abnormal mass or fluid collections  identified. No definite fistula identified. There is some air seen in  the rectal vault. Spleen is unremarkable. The distal colon is  unremarkable. Vascular calcifications seen within the abdominal aorta  and iliac vessels. Small infrarenal abdominal aortic aneurysm stable and  unchanged. No free fluid or free air.     PELVIS: The pelvic organs are unremarkable. The pelvic portion of the  gastrointestinal tract again reveal mild  heterogeneous enlargement of  the prostate with some calcification. There is distal ureteral  distention. Small ventral abdominal wall hernia containing mesenteric  fat only. No free fluid or free air. No abnormal mass or fluid  collections identified. The bony structures are unremarkable.       Impression:       1.  New moderate obstruction of the kidneys bilaterally as well as the  ureters to the bladder. No obstructing lesion identified with  irregularity identified of the prostate. Bladder outlet obstruction  cannot be excluded.  2. Contrast seen within the rectum with no definite evidence of  extravasation or fistulization. Clinical correlation is needed.  3. Ventral abdominal wall hernia containing mesenteric fat only.     D:  09/11/2017  E:  09/11/2017     This report was finalized on 9/11/2017 4:04 PM by Dr. Inna West MD.             Assessment/Plan: BPH with obstruction s/p urethral dilation and navarro placement yesterday.   Home with navarro f/u in office.  Pt will need cystoscopy for further evaluation.    Renal insufficiency not improved yet with navarro placement.   Urine cx pending.         Patient Active Problem List   Diagnosis   • DVT (deep venous thrombosis)   • PE (pulmonary thromboembolism)   • Prostatitis   • Hypertension   • CKD (chronic kidney disease)   • BPH (benign prostatic hyperplasia)   • Acute renal failure   • Obstructive uropathy   • Supratherapeutic INR   • Thrombocytosis   • Elevated sed rate   • Elevated LFTs   • Prostatitis       [unfilled]      Sonny Mckinney MD - 9/12/2017, 3:05 PM

## 2017-09-12 NOTE — PROGRESS NOTES
"      HOSPITALIST DAILY PROGRESS NOTE    Chief Complaint: penile pain    Subjective   SUBJECTIVE/OVERNIGHT EVENTS   Sitting up in chair. Feels weak this am. Complaining of penile pain at catheter insertion site, especially with movement.     Review of Systems:  Gen-no fevers, no chills  CV-no chest pain, no palpitations  Resp-no cough, no dyspnea  GI-no N/V/D, no abd pain    Otherwise complete ROS is negative except as mentioned in the HPI.    Objective   OBJECTIVE   I have reviewed the vital signs.  /56 (BP Location: Right arm, Patient Position: Lying)  Pulse 73  Temp 97.8 °F (36.6 °C) (Oral)   Resp 18  Ht 75.5\" (191.8 cm)  Wt (!) 305 lb 8 oz (139 kg)  SpO2 95%  BMI 37.68 kg/m2    Physical Exam:  Gen-no acute distress  CV-RRR, S1 S2 normal, no m/r/g  Resp-CTAB, no wheezes  Abd-soft, NT, ND, +BS  -navarro in place draining red urine  Ext-no edema  Neuro-A&Ox3, no focal deficits  Psych-appropriate mood    Results:  I have reviewed the labs, culture data, radiology results, and diagnostic studies.      Results from last 7 days  Lab Units 09/12/17  0941 09/11/17  1152   WBC 10*3/mm3 10.03 9.34   HEMOGLOBIN g/dL 12.8* 14.3   HEMATOCRIT % 39.8 43.6   PLATELETS 10*3/mm3 444 491*       Results from last 7 days  Lab Units 09/12/17  0941   SODIUM mmol/L 141   POTASSIUM mmol/L 3.9   CHLORIDE mmol/L 108   CO2 mmol/L 31.0   BUN mg/dL 26*   CREATININE mg/dL 1.50*   GLUCOSE mg/dL 116*   CALCIUM mg/dL 8.4*       Culture Data:  Cultures:    Blood Culture   Date Value Ref Range Status   09/11/2017 No growth at 24 hours  Preliminary   09/11/2017 No growth at 24 hours  Preliminary     Urine Culture   Date Value Ref Range Status   09/11/2017 Culture in progress  Preliminary         Radiology Results: CT A/P personally reviewed with hydronephrosis noted. Agree with interpretation.  Imaging Results (last 24 hours)     Procedure Component Value Units Date/Time    XR Chest 1 View [[id number]] Collected:  09/11/17 1327     " Updated:  09/11/17 1605    Narrative:       EXAMINATION: XR CHEST 1 VW- 09/11/2017     INDICATION: chills      COMPARISON: NONE     FINDINGS: Portable chest reveals heart to be enlarged. Calcified  granuloma identified left lung base. Underlying chronic changes seen  within the lung fields bilaterally. Degenerative changes seen within the  spine. Vascular calcifications identified.           Impression:       No acute cardiopulmonary disease.     D:  09/11/2017  E:  09/11/2017     This report was finalized on 9/11/2017 4:03 PM by Dr. Inna West MD.       CT Abdomen Pelvis Without Contrast [[id number]] Collected:  09/11/17 1533     Updated:  09/11/17 1606    Narrative:       EXAMINATION: CT ABDOMEN PELVIS WO CONTRAST- 09/11/2017     INDICATION: Rule out fistula     TECHNIQUE: Multiple axial CT imaging was obtained of the abdomen and  pelvis from the lung bases through the pubic symphysis following the  administration of rectal contrast.     The radiation dose reduction device was turned on for each scan per the  ALARA (As Low as Reasonably Achievable) protocol.     COMPARISON: 01/24/2016     FINDINGS:      ABDOMEN: The lung bases are grossly clear. Calcified granuloma  identified within the right lower lobe. Liver is homogeneous in  appearance with no stones seen in the gallbladder. Moderate dilatation  identified of the renal collecting systems bilaterally as well as both  ureters. There is mild distention of the bladder. Irregularity  identified within the prostate. Bladder outlet obstruction cannot be  completely excluded. Clinical correlation is needed. There is rectal  contrast seen within the rectum. No abnormal mass or fluid collections  identified. No definite fistula identified. There is some air seen in  the rectal vault. Spleen is unremarkable. The distal colon is  unremarkable. Vascular calcifications seen within the abdominal aorta  and iliac vessels. Small infrarenal abdominal aortic aneurysm  stable and  unchanged. No free fluid or free air.     PELVIS: The pelvic organs are unremarkable. The pelvic portion of the  gastrointestinal tract again reveal mild heterogeneous enlargement of  the prostate with some calcification. There is distal ureteral  distention. Small ventral abdominal wall hernia containing mesenteric  fat only. No free fluid or free air. No abnormal mass or fluid  collections identified. The bony structures are unremarkable.       Impression:       1.  New moderate obstruction of the kidneys bilaterally as well as the  ureters to the bladder. No obstructing lesion identified with  irregularity identified of the prostate. Bladder outlet obstruction  cannot be excluded.  2. Contrast seen within the rectum with no definite evidence of  extravasation or fistulization. Clinical correlation is needed.  3. Ventral abdominal wall hernia containing mesenteric fat only.     D:  09/11/2017  E:  09/11/2017     This report was finalized on 9/11/2017 4:04 PM by Dr. Inna West MD.             I have reviewed the medications.      Assessment/Plan   ASSESSMENT/PLAN    Principal Problem:    Acute renal failure  Active Problems:    DVT (deep venous thrombosis)    PE (pulmonary thromboembolism)    Hypertension    CKD (chronic kidney disease)    BPH (benign prostatic hyperplasia)    Obstructive uropathy    Supratherapeutic INR    Thrombocytosis    Elevated sed rate    Elevated LFTs    Prostatitis    78 y/o male with hx of BPH and prostatitis presenting from home with urinary retention and urinary urgency along with DANIELITO. Patient was difficult navarro placement but ultimately underwent successful placement by Dr. Mckinney.    Plan:  --Continue IV rocephin for presumed prostatitis. Cultures pending but will be modified as he was on PO cipro prior to admission.   --Urology has seen. Recommends continuing navarro until patient returns to see Dr. Mckinney as outpatient. Patient still having some hematuria but  appears to be clearing some. Continue flomax.  --Creatinine still slightly elevated above baseline but expect this will return to normal with relief of obstruction and IVF. BMP in am.  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 His INR was reversed upon arrival so that navarro could be placed. Can continue to hold coumadin until hematuria resolves then should restart.  --Labs in am.    Stacey Boles II, DO  09/12/17  1:01 PM

## 2017-09-12 NOTE — PROGRESS NOTES
Discharge Planning Assessment  [facility]     Patient Name: Arthur Tran  MRN: [id number]  Today's Date: 9/12/2017    Admit Date: 9/11/2017          Discharge Needs Assessment       09/12/17 9554    Living Environment    Lives With alone    Living Arrangements house    Transportation Available car;family or friend will provide    Living Environment    Provides Primary Care For no one    Quality Of Family Relationships supportive;other (see comments)   not very involved    Able to Return to Prior Living Arrangements yes    Discharge Needs Assessment    Concerns To Be Addressed discharge planning concerns    Readmission Within The Last 30 Days no previous admission in last 30 days    Outpatient/Agency/Support Group Needs other (see comments)   Dr. Gomez manages coumadin and INR    Anticipated Changes Related to Illness none    Equipment Currently Used at Home none    Equipment Needed After Discharge none    Discharge Disposition home or self-care;skilled nursing facility    Discharge Contact Information if Applicable [phone]    Discharge Planning Comments home v rehab            Discharge Plan       09/12/17 7975    Case Management/Social Work Plan    Plan home v rehab    Patient/Family In Agreement With Plan yes    Additional Comments Mr. Tran lives alone in his home in [place]. He has children but he states they are not very involved in his life, that they have their own family Mr. Tran is requesting resources for additonal help at [facility]. CM has consulted  for assistance. Orders received for rehab referral. Mr. Tran is agreeable to rehab and would prefer [facility], however I spoke with Sharron at [facility] and they do not have any male skilled beds at this time. His second choice is to return back to [facility] as he has been there in the past. Called referral to Camille at [facility]. Currently he has no other concerns or needs identified at this time. He may need  asssistance in transportation at time of discharge.         Discharge Placement     Facility/Agency Request Status Selected? Address Phone Number Fax Number    [facility] Pending - No Request Sent     [address] [facility] [address] [phone]        Expected Discharge Date and Time     Expected Discharge Date Expected Discharge Time    Sep 14, 2017               Demographic Summary       09/12/17 1531    Referral Information    Admission Type inpatient    Arrived From home or self-care    Referral Source admission list;physician    Reason For Consult discharge planning    Record Reviewed clinical discipline documentation;history and physical;medical record    Contact Information    Permission Granted to Share Information With     Primary Care Physician Information    Name medardo barahona            Functional Status       09/12/17 1532    Functional Status Current    Ambulation 2-->assistive person    Transferring 2-->assistive person    Toileting 2-->assistive person    Bathing 0-->independent    Dressing 0-->independent    Eating 0-->independent    Communication 0-->understands/communicates without difficulty    Swallowing (if score 2 or more for any item, consult Rehab Services) 0-->swallows foods/liquids without difficulty    Change in Functional Status Since Onset of Current Illness/Injury no    Functional Status Prior    Ambulation 0-->independent    Transferring 0-->independent    Toileting 0-->independent    Bathing 0-->independent    Dressing 0-->independent    Eating 0-->independent    Communication 0-->understands/communicates without difficulty    Swallowing 0-->swallows foods/liquids without difficulty    IADL    Medications independent   has rx drug coverage with no issues in obtaining or affording copays    Meal Preparation independent    Housekeeping independent    Laundry independent    Shopping independent    Oral Care independent    Activity  Tolerance    Current Activity Limitations none    Usual Activity Tolerance moderate    Current Activity Tolerance fair    Cognitive/Perceptual/Developmental    Current Mental Status/Cognitive Functioning no deficits noted    Recent Changes in Mental Status/Cognitive Functioning no changes    Employment/Financial    Financial Concerns none   has Medicare A and anthem insurance with no recent changes to coverage            Psychosocial     None            Abuse/Neglect     None            Legal     None            Substance Abuse     None            Patient Forms     None          Georgia Matta RN

## 2017-09-12 NOTE — PROGRESS NOTES
Continued Stay Note  [facility]     Patient Name: Arthur Tran  MRN: [id number]  Today's Date: 9/12/2017    Admit Date: 9/11/2017          Discharge Plan       09/12/17 1625    Case Management/Social Work Plan    Plan Social work provided a packet of information about the [facility] to Mr[facility] Tran and explained assistance programs to him for community assistance through the [facility].    Patient/Family In Agreement With Plan yes      09/12/17 1535    Case Management/Social Work Plan    Plan home v rehab    Patient/Family In Agreement With Plan yes    Additional Comments Mr. Tran lives alone in his home in [place]. He has children but he states they are not very involved in his life, that they have their own family Mr. Tran is requesting resources for additonal help at home maybe through the [facility]. CM has consulted  for assistance. Orders received for rehab referral. Mr. Tran is agreeable to rehab and would prefer [facility], however I spoke with Sharron at [facility] and they do not have any male skilled beds at this time. His second choice is to return back to [facility] as he has been there in the past. Called referral to Camille at [facility]. Currently he has no other concerns or needs identified at this time. He may need asssistance in transportation at time of discharge.               Discharge Codes     None        Expected Discharge Date and Time     Expected Discharge Date Expected Discharge Time    Sep 14, 2017             MARY Herrera

## 2017-09-13 LAB
ANION GAP SERPL CALCULATED.3IONS-SCNC: 5 MMOL/L (ref 3–11)
BACTERIA SPEC AEROBE CULT: NORMAL
BUN BLD-MCNC: 21 MG/DL (ref 9–23)
BUN/CREAT SERPL: 16.2 (ref 7–25)
CALCIUM SPEC-SCNC: 7.9 MG/DL (ref 8.7–10.4)
CHLORIDE SERPL-SCNC: 105 MMOL/L (ref 99–109)
CO2 SERPL-SCNC: 29 MMOL/L (ref 20–31)
CREAT BLD-MCNC: 1.3 MG/DL (ref 0.6–1.3)
DEPRECATED RDW RBC AUTO: 49 FL (ref 37–54)
ERYTHROCYTE [DISTWIDTH] IN BLOOD BY AUTOMATED COUNT: 14.4 % (ref 11.3–14.5)
GFR SERPL CREATININE-BSD FRML MDRD: 54 ML/MIN/1.73
GLUCOSE BLD-MCNC: 91 MG/DL (ref 70–100)
HCT VFR BLD AUTO: 39.2 % (ref 38.9–50.9)
HGB BLD-MCNC: 12.3 G/DL (ref 13.1–17.5)
MCH RBC QN AUTO: 29 PG (ref 27–31)
MCHC RBC AUTO-ENTMCNC: 31.4 G/DL (ref 32–36)
MCV RBC AUTO: 92.5 FL (ref 80–99)
PLATELET # BLD AUTO: 390 10*3/MM3 (ref 150–450)
PMV BLD AUTO: 10 FL (ref 6–12)
POTASSIUM BLD-SCNC: 4.3 MMOL/L (ref 3.5–5.5)
RBC # BLD AUTO: 4.24 10*6/MM3 (ref 4.2–5.76)
SODIUM BLD-SCNC: 139 MMOL/L (ref 132–146)
WBC NRBC COR # BLD: 8.17 10*3/MM3 (ref 3.5–10.8)

## 2017-09-13 PROCEDURE — 99233 SBSQ HOSP IP/OBS HIGH 50: CPT | Performed by: INTERNAL MEDICINE

## 2017-09-13 PROCEDURE — 25010000003 CEFTRIAXONE PER 250 MG: Performed by: NURSE PRACTITIONER

## 2017-09-13 PROCEDURE — 25010000002 MORPHINE SULFATE (PF) 2 MG/ML SOLUTION: Performed by: INTERNAL MEDICINE

## 2017-09-13 PROCEDURE — 80048 BASIC METABOLIC PNL TOTAL CA: CPT | Performed by: INTERNAL MEDICINE

## 2017-09-13 PROCEDURE — 97163 PT EVAL HIGH COMPLEX 45 MIN: CPT

## 2017-09-13 PROCEDURE — 85027 COMPLETE CBC AUTOMATED: CPT | Performed by: INTERNAL MEDICINE

## 2017-09-13 PROCEDURE — 97165 OT EVAL LOW COMPLEX 30 MIN: CPT

## 2017-09-13 PROCEDURE — 97110 THERAPEUTIC EXERCISES: CPT

## 2017-09-13 RX ORDER — OXYCODONE AND ACETAMINOPHEN 10; 325 MG/1; MG/1
1 TABLET ORAL EVERY 4 HOURS PRN
Status: DISCONTINUED | OUTPATIENT
Start: 2017-09-13 | End: 2017-09-14 | Stop reason: HOSPADM

## 2017-09-13 RX ORDER — POTASSIUM CHLORIDE 750 MG/1
10 CAPSULE, EXTENDED RELEASE ORAL 2 TIMES DAILY WITH MEALS
Status: DISCONTINUED | OUTPATIENT
Start: 2017-09-13 | End: 2017-09-14 | Stop reason: HOSPADM

## 2017-09-13 RX ORDER — FINASTERIDE 5 MG/1
5 TABLET, FILM COATED ORAL DAILY
Status: DISCONTINUED | OUTPATIENT
Start: 2017-09-13 | End: 2017-09-14 | Stop reason: HOSPADM

## 2017-09-13 RX ORDER — OXYBUTYNIN CHLORIDE 5 MG/1
5 TABLET ORAL 2 TIMES DAILY
Status: DISCONTINUED | OUTPATIENT
Start: 2017-09-13 | End: 2017-09-14 | Stop reason: HOSPADM

## 2017-09-13 RX ORDER — LISINOPRIL 10 MG/1
10 TABLET ORAL DAILY
Status: DISCONTINUED | OUTPATIENT
Start: 2017-09-13 | End: 2017-09-14 | Stop reason: HOSPADM

## 2017-09-13 RX ORDER — FUROSEMIDE 40 MG/1
40 TABLET ORAL DAILY
Status: DISCONTINUED | OUTPATIENT
Start: 2017-09-13 | End: 2017-09-14 | Stop reason: HOSPADM

## 2017-09-13 RX ORDER — WARFARIN SODIUM 2 MG/1
4 TABLET ORAL DAILY
Status: DISCONTINUED | OUTPATIENT
Start: 2017-09-13 | End: 2017-09-14

## 2017-09-13 RX ORDER — MORPHINE SULFATE 2 MG/ML
2 INJECTION, SOLUTION INTRAMUSCULAR; INTRAVENOUS ONCE
Status: COMPLETED | OUTPATIENT
Start: 2017-09-13 | End: 2017-09-13

## 2017-09-13 RX ORDER — POTASSIUM CHLORIDE 600 MG/1
8 CAPSULE, EXTENDED RELEASE ORAL 2 TIMES DAILY WITH MEALS
Status: DISCONTINUED | OUTPATIENT
Start: 2017-09-13 | End: 2017-09-13

## 2017-09-13 RX ADMIN — LISINOPRIL 10 MG: 10 TABLET ORAL at 16:42

## 2017-09-13 RX ADMIN — POTASSIUM CHLORIDE 10 MEQ: 750 CAPSULE, EXTENDED RELEASE ORAL at 19:02

## 2017-09-13 RX ADMIN — OXYCODONE HYDROCHLORIDE AND ACETAMINOPHEN 1 TABLET: 10; 325 TABLET ORAL at 23:59

## 2017-09-13 RX ADMIN — FINASTERIDE 5 MG: 5 TABLET, FILM COATED ORAL at 16:41

## 2017-09-13 RX ADMIN — OXYBUTYNIN CHLORIDE 5 MG: 5 TABLET ORAL at 19:01

## 2017-09-13 RX ADMIN — OXYCODONE HYDROCHLORIDE AND ACETAMINOPHEN 1 TABLET: 10; 325 TABLET ORAL at 19:20

## 2017-09-13 RX ADMIN — CEFTRIAXONE SODIUM 2 G: 2 INJECTION, SOLUTION INTRAVENOUS at 20:25

## 2017-09-13 RX ADMIN — FUROSEMIDE 40 MG: 40 TABLET ORAL at 16:42

## 2017-09-13 RX ADMIN — TAMSULOSIN HYDROCHLORIDE 0.4 MG: 0.4 CAPSULE ORAL at 21:23

## 2017-09-13 RX ADMIN — OXYCODONE HYDROCHLORIDE AND ACETAMINOPHEN 1 TABLET: 7.5; 325 TABLET ORAL at 03:53

## 2017-09-13 RX ADMIN — BUPROPION HYDROCHLORIDE 150 MG: 150 TABLET, EXTENDED RELEASE ORAL at 09:53

## 2017-09-13 RX ADMIN — WARFARIN SODIUM 4 MG: 2 TABLET ORAL at 16:43

## 2017-09-13 RX ADMIN — BUPROPION HYDROCHLORIDE 150 MG: 150 TABLET, EXTENDED RELEASE ORAL at 20:25

## 2017-09-13 RX ADMIN — MORPHINE SULFATE 2 MG: 2 INJECTION, SOLUTION INTRAMUSCULAR; INTRAVENOUS at 14:03

## 2017-09-13 RX ADMIN — OXYCODONE HYDROCHLORIDE AND ACETAMINOPHEN 1 TABLET: 10; 325 TABLET ORAL at 14:03

## 2017-09-13 NOTE — THERAPY EVALUATION
Acute Care - Physical Therapy Initial Evaluation  [facility]     Patient Name: Arthur Tran  : 1940  MRN: [id number]  Today's Date: 2017   Onset of Illness/Injury or Date of Surgery Date: 17  Date of Referral to PT: 17  Referring Physician: MD Yusuf      Admit Date: 2017     Visit Dx:    ICD-10-CM ICD-9-CM   1. Acute renal failure, unspecified acute renal failure type N17.9 584.9   2. Bilateral hydronephrosis N13.30 591   3. Elevated INR R79.1 790.92   4. Adult failure to thrive syndrome R62.7 783.7   5. Bladder outlet obstruction N32.0 596.0   6. Impaired mobility and ADLs Z74.09 799.89   7. Impaired functional mobility, balance, gait, and endurance Z74.09 V49.89     Patient Active Problem List   Diagnosis   • DVT (deep venous thrombosis)   • PE (pulmonary thromboembolism)   • Prostatitis   • Hypertension   • CKD (chronic kidney disease)   • BPH (benign prostatic hyperplasia)   • Acute renal failure   • Obstructive uropathy   • Supratherapeutic INR   • Thrombocytosis   • Elevated sed rate   • Elevated LFTs   • Prostatitis     Past Medical History:   Diagnosis Date   • BPH (benign prostatic hyperplasia)    • CHF (congestive heart failure)    • CKD (chronic kidney disease)    • DVT (deep venous thrombosis)    • Hypertension    • PE (pulmonary thromboembolism)    • Prostatitis      Past Surgical History:   Procedure Laterality Date   • RHINOPLASTY     • TONSILLECTOMY AND ADENOIDECTOMY      as child          PT ASSESSMENT (last 72 hours)      PT Evaluation       17 1100 17 1055    Rehab Evaluation    Document Type evaluation  -GRIFFIN evaluation  -AC    Subjective Information agree to therapy;complains of;pain  -GRIFFIN agree to therapy;complains of;pain  -AC    Patient Effort, Rehab Treatment fair  -GRIFFIN fair  -AC    Symptoms Noted During/After Treatment --   patient agitated  -GRIFFIN --   pt agitated with therapy request to ambulate d/t pain  -AC    General Information    Patient Profile  Review yes  -GRIFFIN yes  -AC    Onset of Illness/Injury or Date of Surgery Date 09/11/17  -GRIFFIN 09/11/17  -AC    Referring Physician MD Yusuf  -GRIFFIN MD Paramjit  -AC    General Observations   Pt received supine in bed. IV intact  -AC    Pertinent History Of Current Problem patient admitted with rectal drainage, weakness UTI , renal failure  -GRIFFIN Pt admit with rectal drainage, weakness x 4-5 days. Acute renal failure; was being treated for UTI prior to admission  -AC    Precautions/Limitations fall precautions  -GRIFFIN fall precautions   impulsive  -AC    Prior Level of Function independent:;gait;transfer;bed mobility;ADL's  -GRIFFIN independent:;all household mobility;ADL's  -AC    Equipment Currently Used at Home shower chair  -GRIFFIN shower chair   has SW, but was not using  -AC    Plans/Goals Discussed With patient;agreed upon  -GRIFFIN patient  -AC    Risks Reviewed patient:;LOB;increased discomfort  -GRIFIFN patient:;LOB  -AC    Benefits Reviewed patient:;improve function;increase strength;decrease risk of DVT  -GRIFFIN patient:;improve function;increase independence;increase strength;increase balance;increase knowledge  -AC    Barriers to Rehab ineffective coping  -GRIFFIN ineffective coping  -AC    Living Environment    Lives With alone  -GRIFFIN alone  -AC    Living Arrangements house  -GRIFFIN house  -AC    Home Accessibility stairs to enter home  -GRIFFIN stairs to enter home;tub/shower is not walk in  -AC    Number of Stairs to Enter Home 2  -GRIFFIN 2   1 in front; 2 steps in back  -AC    Clinical Impression    Date of Referral to PT 09/13/17  -GRIFFIN     PT Diagnosis impaired transfers and gait, decreased strength and balance  -GRIFFIN     Patient/Family Goals Statement patient to go to skilled nursing facility for rehab  -GRIFFIN     Criteria for Skilled Therapeutic Interventions Met yes;treatment indicated  -GRIFFIN     Rehab Potential good, to achieve stated therapy goals  -GRIFFIN     Pain Assessment    Pain Assessment Antonio-Hopkins FACES  -GRIFFIN Antonio-Hopkins FACES  -AC    Antonio-Hopkins FACES Pain  Rating 4  -GRIFFIN 4  -AC    Pain Location Penis  -GRIFFIN --   catheter site  -AC    Pain Intervention(s) Repositioned  -GRIFFIN Repositioned  -AC    Vision Assessment/Intervention    Visual Impairment  WFL  -AC    Cognitive Assessment/Intervention    Current Cognitive/Communication Assessment functional  -GRIFFIN functional   decreased safety awareness  -AC    Orientation Status oriented x 4  -GRIFFIN oriented x 4  -AC    Follows Commands/Answers Questions 75% of the time;able to follow single-step instructions;needs repetition  -GRIFFIN 75% of the time;needs cueing;needs repetition   limited d/t impulsiveness; decr attn to task  -AC    Personal Safety mild impairment;decreased insight to deficits;decreased awareness, need for safety;decreased awareness, need for assist;impulsive  -GRIFFIN mild impairment  -AC    Personal Safety Interventions fall prevention program maintained;gait belt;nonskid shoes/slippers when out of bed  -GRIFFIN fall prevention program maintained;gait belt;nonskid shoes/slippers when out of bed  -AC    ROM (Range of Motion)    General ROM no range of motion deficits identified;upper extremity range of motion deficits identified;lower extremity range of motion deficits identified  -GRIFFIN no range of motion deficits identified  -    MMT (Manual Muscle Testing)    General MMT Assessment upper extremity strength deficits identified;lower extremity strength deficits identified   generalized weakness 4-/5  -GRIFFIN upper extremity strength deficits identified  -AC    Bed Mobility, Assessment/Treatment    Bed Mobility, Assistive Device bed rails;head of bed elevated  -GRIFFIN head of bed elevated;bed rails  -    Bed Mob, Supine to Sit, [place] verbal cues required;supervision required  -GRIFFIN verbal cues required;supervision required  -AC    Transfer Assessment/Treatment    Transfers, Bed-Chair [place] verbal cues required  -GRIFFIN verbal cues required;contact guard assist  -AC    Transfers, Sit-Stand [place] verbal cues required;contact  guard assist  -GRIFFIN verbal cues required;contact guard assist  -AC    Transfers, Stand-Sit Carlton verbal cues required;contact guard assist  -GRIFFIN verbal cues required;contact guard assist;2 person assist required  -AC    Transfer, Comment patient is impulsive scared of falling but wanting to get up on his own without reguard to IV pole or where catheter is  -GRIFFIN impulsively standing before gait belt applied and reaching for IV pole befiore allowing therapist to place walker in front of pt  -AC    Gait Assessment/Treatment    Gait, Carlton Level minimum assist (75% patient effort);2 person assist required  -GRIFFIN     Gait, Distance (Feet) 5  -GRIFFIN     Gait, Gait Pattern Analysis swing-to gait  -GRIFFIN     Gait, Gait Deviations forward flexed posture;step length decreased  -GRIFFIN     Gait, Safety Issues step length decreased;weight-shifting ability decreased;balance decreased during turns  -GRIFFIN     Gait, Impairments impaired balance;strength decreased  -GRIFFIN     Gait, Comment patient refused ambulation due to pain from catheter when being up patient will need walker for ambulation  -GRIFFIN     Positioning and Restraints    Pre-Treatment Position in bed  -GRIFFIN in bed  -AC    Post Treatment Position chair  -GRIFFIN chair  -AC    In Chair notified nsg;reclined;sitting;call light within reach;exit alarm on  -GRIFFIN sitting;call light within reach;encouraged to call for assist;exit alarm on  -AC      09/12/17 1533 09/11/17 2100    General Information    Equipment Currently Used at Home none  -PP none  -KW    Living Environment    Lives With alone  -PP alone  -KW    Living Arrangements house  -PP house  -KW    Home Accessibility  no concerns  -KW    Stair Railings at Home  none  -KW    Type of Financial/Environmental Concern  none  -KW    Transportation Available car;family or friend will provide  -PP other (see comments)   Pt is unsure of transport home, possibly taxi  -KW      User Key  (r) = Recorded By, (t) = Taken By, (c) = Cosigned By     Initials Name Provider Type    GRIFFIN Monica Mojica, PT Physical Therapist    AC Clemencia Harper, OT Occupational Therapist    PP Georgia Matta, RN Case Manager    KW Doreen Rayo, RN Registered Nurse          Physical Therapy Education     Title: PT OT SLP Therapies (Active)     Topic: Physical Therapy (Active)     Point: Mobility training (Active)    Learning Progress Summary    Learner Readiness Method Response Comment Documented by Status   Patient Acceptance E NR   09/13/17 1254 Active               Point: Home exercise program (Active)    Learning Progress Summary    Learner Readiness Method Response Comment Documented by Status   Patient Acceptance E NR   09/13/17 1254 Active               Point: Body mechanics (Active)    Learning Progress Summary    Learner Readiness Method Response Comment Documented by Status   Patient Acceptance E NR   09/13/17 1254 Active               Point: Precautions (Active)    Learning Progress Summary    Learner Readiness Method Response Comment Documented by Status   Patient Acceptance E NR   09/13/17 1254 Active                      User Key     Initials Effective Dates Name Provider Type Discipline     06/19/15 -  Monica Mojica PT Physical Therapist PT                PT Recommendation and Plan  Anticipated Discharge Disposition: skilled nursing facility  PT Frequency: daily, per priority policy  Plan of Care Review  Plan Of Care Reviewed With: patient  Outcome Summary/Follow up Plan: patient able to transfer from bed to chair limited ambulation due to pain from catheter site. patient will need rolling walker for ambulation          IP PT Goals       09/13/17 1255          Bed Mobility PT LTG    Bed Mobility PT LTG, Date Established 09/13/17  -GRIFFIN      Bed Mobility PT LTG, Time to Achieve 2 wks  -GRIFFIN      Bed Mobility PT LTG, Activity Type supine to sit/sit to supine  -GRIFFIN      Bed Mobility PT LTG, [place] Level independent  -GRIFFIN      Bed Mobility PT  LTG, Outcome goal ongoing  -GRIFFIN      Transfer Training PT LTG    Transfer Training PT LTG, Date Established 09/13/17  -GRIFFIN      Transfer Training PT LTG, Time to Achieve 2 wks  -GRIFFIN      Transfer Training PT LTG, Activity Type sit to stand/stand to sit  -GRIFFIN      Transfer Training PT LTG, [place] Level independent  -GRIFFIN      Transfer Training PT LTG, Outcome goal ongoing  -GRIFFIN      Gait Training PT LTG    Gait Training Goal PT LTG, Date Established 09/13/17  -GRIFFIN      Gait Training Goal PT LTG, Time to Achieve 2 wks  -GRIFFIN      Gait Training Goal PT LTG, [place] Level independent  -GRIFFIN      Gait Training Goal PT LTG, Assist Device walker, rolling  -GRIFFIN      Gait Training Goal PT LTG, Distance to Achieve 250  -GRIFFIN      Gait Training Goal PT LTG, Outcome goal ongoing  -GRIFFIN        User Key  (r) = Recorded By, (t) = Taken By, (c) = Cosigned By    Initials Name Provider Type    GRIFFIN Mojica, PT Physical Therapist                Outcome Measures       09/13/17 1100 09/13/17 1055       How much help from another person do you currently need...    Turning from your back to your side while in flat bed without using bedrails? 4  -GRIFFIN      Moving from lying on back to sitting on the side of a flat bed without bedrails? 4  -GRIFFIN      Moving to and from a bed to a chair (including a wheelchair)? 3  -GRIFFIN      Standing up from a chair using your arms (e.g., wheelchair, bedside chair)? 3  -GRIFFIN      Climbing 3-5 steps with a railing? 2  -GRIFFIN      To walk in hospital room? 3  -GRIFFIN      AM-PAC 6 Clicks Score 19  -GRIFFIN      How much help from another is currently needed...    Putting on and taking off regular lower body clothing?  3  -AC     Bathing (including washing, rinsing, and drying)  2  -AC     Toileting (which includes using toilet bed pan or urinal)  2  -AC     Putting on and taking off regular upper body clothing  3  -AC     Taking care of personal grooming (such as brushing teeth)  4  -AC     Eating meals  4  -AC     Score  18   -AC     Functional Assessment    Outcome Measure Options AM-PAC 6 Clicks Basic Mobility (PT)  -GRIFFIN AM-PAC 6 Clicks Daily Activity (OT)  -AC       User Key  (r) = Recorded By, (t) = Taken By, (c) = Cosigned By    Initials Name Provider Type    GRIFFIN Mojica, PT Physical Therapist    AUGUST Harper, OT Occupational Therapist           Time Calculation:         PT Charges       09/13/17 1257          Time Calculation    Start Time 1100  -GRIFFIN      PT Received On 09/13/17  -GRIFFIN      PT Goal Re-Cert Due Date 09/23/17  -GRIFFIN      Time Calculation- PT    Total Timed Code Minutes- PT 13 minute(s)  -GRIFFIN        User Key  (r) = Recorded By, (t) = Taken By, (c) = Cosigned By    Initials Name Provider Type    GRIFFIN Mojica PT Physical Therapist          Therapy Charges for Today     Code Description Service Date Service Provider Modifiers Qty    57636908850 HC PT THER PROC EA 15 MIN 9/13/2017 Monica Mojica, PT GP 1    47345413237 HC PT EVAL HIGH COMPLEXITY 3 9/13/2017 Monica Mojica, PT GP 1          PT G-Codes  Outcome Measure Options: AM-PAC 6 Clicks Basic Mobility (PT)      Monica Mojica, PT  9/13/2017

## 2017-09-13 NOTE — PROGRESS NOTES
"    [facility] Medicine Services  INPATIENT PROGRESS NOTE    Date of Admission: 9/11/2017  Length of Stay: 1  Primary Care Physician: Edgardo Gomez MD    Subjective   CC: \"my prostate hurts\"    HPI:  Patient's main complaint is pain related to navarro.  Said previously \"[facility] [place] put me on oxycontin 60mg\" and is upset with current pain management.  Says he is not on any chronic pain meds at home.  Hematuria has cleared.    Review Of Systems:   Review of Systems   Constitutional: Negative for fever.   Respiratory: Negative for cough and shortness of breath.    Cardiovascular: Negative for chest pain and leg swelling.   Gastrointestinal: Negative for abdominal distention.   Genitourinary:        + prostate pain   All other systems reviewed and are negative.      Otherwise 10 system ROS negative except as noted pertinent positives above in HPI.     Objective      Temp:  [97.9 °F (36.6 °C)-98.3 °F (36.8 °C)] 98 °F (36.7 °C)  Heart Rate:  [65-85] 83  Resp:  [18-20] 18  BP: (102-156)/(45-75) 129/72  Physical Exam   Constitutional: He is oriented to person, place, and time. He appears well-developed and well-nourished.   A little agitated about pain meds, but appears comfortable   Eyes: Pupils are equal, round, and reactive to light.   Cardiovascular: Normal rate, regular rhythm and normal heart sounds.    No murmur heard.  Pulmonary/Chest: Effort normal and breath sounds normal. No respiratory distress. He has no wheezes.   Abdominal: Soft. Bowel sounds are normal. He exhibits no distension. There is no tenderness.   Genitourinary:   Genitourinary Comments: Navarro in place, clear yellow urine in bag   Musculoskeletal: He exhibits no edema.   Neurological: He is alert and oriented to person, place, and time. No cranial nerve deficit.   No focal deficits   Skin: Skin is warm and dry. No pallor.   Psychiatric: He has a normal mood and affect.   Vitals reviewed.      Results Review:    I have reviewed " the labs, radiology results and diagnostic studies.      Results from last 7 days  Lab Units 09/13/17  0631   WBC 10*3/mm3 8.17   HEMOGLOBIN g/dL 12.3*   PLATELETS 10*3/mm3 390       Results from last 7 days  Lab Units 09/13/17  0631   SODIUM mmol/L 139   POTASSIUM mmol/L 4.3   CHLORIDE mmol/L 105   CO2 mmol/L 29.0   BUN mg/dL 21   CREATININE mg/dL 1.30   GLUCOSE mg/dL 91   CALCIUM mg/dL 7.9*       Culture Data:      Blood Culture   Date Value Ref Range Status   09/11/2017 No growth at 2 days  Preliminary   09/11/2017 No growth at 2 days  Preliminary     Urine Culture   Date Value Ref Range Status   09/11/2017 50,000-60,000 CFU/mL Normal Urogenital Joseline  Final       Radiology Data:   All imaging reviewed    I have reviewed the medications.    Assessment/Plan     Problem List  Hospital Problem List     * (Principal)Acute renal failure    DVT (deep venous thrombosis)    PE (pulmonary thromboembolism)    Hypertension    CKD (chronic kidney disease)    BPH (benign prostatic hyperplasia)    Obstructive uropathy    Supratherapeutic INR    Thrombocytosis    Elevated sed rate    Elevated LFTs    Prostatitis          Assessment/Plan:    76 yo male presents with urinary retention, ARF.    - s/p navarro placement by urology in ED.  Required serial urethral dilations.  Plan is to continue navarro until follow up with Dr. Mckinney.  - Cr trending down today  - no hematuria, ok to resume coumadin  - patient upset about pain management, however I do not feel starting oxycontin is appropriate for acute pain setting.  Will give 1 dose of IV morphine to get him more comfortable and increase to percocet 10  - will continue rocephin, change to PO at discharge to complete 7 days total  - d/w CM.  Plan to [place] tomorrow at 1200, they will send transportation.    DVT prophylaxis: coumadin    Pablo Sinclair MD   09/13/17   5:37 PM

## 2017-09-13 NOTE — PROGRESS NOTES
Continued Stay Note  [facility]     Patient Name: Arthur Tran  MRN: [id number]  Today's Date: 9/13/2017    Admit Date: 9/11/2017          Discharge Plan       09/13/17 1208    Case Management/Social Work Plan    Plan [facility]    Patient/Family In Agreement With Plan yes    Additional Comments Per Yuli at [facility], they will have a bed avlb for Mr. Tran on 9/14. Mr. Tran has accepted bed but will need transportation to facility. [facility] has their transport van scheduled for 9/14 at Noon. This is the only time they have a avlb to transport. CM spoke with attending and he anticipates Mr. Tran will be medically ready for discharge on 9/14 but will review chart this afternoon and let CM know if he is not medically ready. Nurse to call report on 9/14 to [phone]. CM to fax discharge summary to [phone]. Facility will call  nurses station to let them know when they are on their way to hospital and they will meet Mr. Tran outside of [address]ld[address] ([address]). Email sent to extenders. No other needs noted at this time.               Discharge Codes       09/13/17 1217    Discharge Codes    Discharge Codes 03  Discharged/transferred to skilled nursing facility (SNF) with Medicare certification in anticipation of skilled care        Expected Discharge Date and Time     Expected Discharge Date Expected Discharge Time    Sep 14, 2017             Georgia Matta RN

## 2017-09-13 NOTE — PLAN OF CARE
Problem: Patient Care Overview (Adult)  Goal: Plan of Care Review  Outcome: Ongoing (interventions implemented as appropriate)    09/13/17 1255   Coping/Psychosocial Response Interventions   Plan Of Care Reviewed With patient   Outcome Evaluation   Outcome Summary/Follow up Plan patient able to transfer from bed to chair limited ambulation due to pain from catheter site. patient will need rolling walker for ambulation         Problem: Inpatient Physical Therapy  Goal: Bed Mobility Goal LTG- PT  Outcome: Ongoing (interventions implemented as appropriate)    09/13/17 1255   Bed Mobility PT LTG   Bed Mobility PT LTG, Date Established 09/13/17   Bed Mobility PT LTG, Time to Achieve 2 wks   Bed Mobility PT LTG, Activity Type supine to sit/sit to supine   Bed Mobility PT LTG, [place] Level independent   Bed Mobility PT LTG, Outcome goal ongoing       Goal: Transfer Training Goal 1 LTG- PT  Outcome: Ongoing (interventions implemented as appropriate)    09/13/17 1255   Transfer Training PT LTG   Transfer Training PT LTG, Date Established 09/13/17   Transfer Training PT LTG, Time to Achieve 2 wks   Transfer Training PT LTG, Activity Type sit to stand/stand to sit   Transfer Training PT LTG, [place] Level independent   Transfer Training PT LTG, Outcome goal ongoing       Goal: Gait Training Goal LTG- PT  Outcome: Ongoing (interventions implemented as appropriate)    09/13/17 1255   Gait Training PT LTG   Gait Training Goal PT LTG, Date Established 09/13/17   Gait Training Goal PT LTG, Time to Achieve 2 wks   Gait Training Goal PT LTG, [place] Level independent   Gait Training Goal PT LTG, Assist Device walker, rolling   Gait Training Goal PT LTG, Distance to Achieve 250   Gait Training Goal PT LTG, Outcome goal ongoing

## 2017-09-13 NOTE — PLAN OF CARE
Problem: Patient Care Overview (Adult)  Goal: Plan of Care Review  Outcome: Ongoing (interventions implemented as appropriate)    09/13/17 1148   Coping/Psychosocial Response Interventions   Plan Of Care Reviewed With patient   Outcome Evaluation   Outcome Summary/Follow up Plan OT eval complete. Pt presents with impulsiveness, decreased strength and balance limiting independence and safety with self cafe and mobiltiy. Pt will benefit from OT to advance pt toward PLOF with self care.          Problem: Inpatient Occupational Therapy  Goal: Strength Goal LTG- OT  Outcome: Ongoing (interventions implemented as appropriate)    09/13/17 1148   Strength OT LTG   Strength Goal OT LTG, Date Established 09/13/17   Strength Goal OT LTG, Time to Achieve by discharge   Strength Goal OT LTG, Measure to Achieve pt will complete B UE strengthening HEP 1 set 15 reps       Goal: Safety Awareness Goal LTG- OT  Outcome: Ongoing (interventions implemented as appropriate)    09/13/17 1148   Safety Awareness OT LTG   Safety Awareness OT LTG, Date Established 09/13/17   Safety Awareness OT LTG, Time to Achieve by discharge   Safety Awareness OT LTG, Activity Type good safety awareness;with ADL's       Goal: Toileting Goal LTG- OT  Outcome: Ongoing (interventions implemented as appropriate)    09/13/17 1148   Toileting OT LTG   Toileting Goal OT LTG, Date Established 09/13/17   Toileting Goal OT LTG, Time to Achieve by discharge   Toileting Goal OT LTG, [place] Level conditional independence       Goal: Functional Mobility Goal LTG- OT  Outcome: Ongoing (interventions implemented as appropriate)    09/13/17 1148   Functional Mobility OT LTG   Functional Mobility Goal OT LTG, Date Established 09/13/17   Functional Mobility Goal OT LTG, Time to Achieve by discharge   Functional Mobility Goal OT LTG, Center Level supervision   Functional Mobility Goal OT LTG, Assist Device rolling walker   Functional Mobility Goal OT LTG, Distance  to Achieve to the bathroom

## 2017-09-13 NOTE — PLAN OF CARE
Problem: Patient Care Overview (Adult)  Goal: Plan of Care Review  Outcome: Ongoing (interventions implemented as appropriate)    09/13/17 0457   Coping/Psychosocial Response Interventions   Plan Of Care Reviewed With patient   Patient Care Overview   Progress progress toward functional goals as expected   Outcome Evaluation   Outcome Summary/Follow up Plan Navarro output clear yellow with sporadic, scant bleeding noted from penile meatus. Pt appears to be tolerating navarro cath well. Pain well controlled with current PRN medications.          Problem: Renal Failure/Kidney Injury, Acute (Adult)  Goal: Signs and Symptoms of Listed Potential Problems Will be Absent or Manageable (Renal Failure/Kidney Injury, Acute)  Outcome: Ongoing (interventions implemented as appropriate)

## 2017-09-14 VITALS
TEMPERATURE: 97.9 F | WEIGHT: 305.5 LBS | DIASTOLIC BLOOD PRESSURE: 65 MMHG | HEIGHT: 76 IN | BODY MASS INDEX: 37.2 KG/M2 | OXYGEN SATURATION: 92 % | RESPIRATION RATE: 18 BRPM | HEART RATE: 89 BPM | SYSTOLIC BLOOD PRESSURE: 130 MMHG

## 2017-09-14 LAB
ANION GAP SERPL CALCULATED.3IONS-SCNC: 6 MMOL/L (ref 3–11)
BUN BLD-MCNC: 16 MG/DL (ref 9–23)
BUN/CREAT SERPL: 13.3 (ref 7–25)
CALCIUM SPEC-SCNC: 7.9 MG/DL (ref 8.7–10.4)
CHLORIDE SERPL-SCNC: 106 MMOL/L (ref 99–109)
CO2 SERPL-SCNC: 25 MMOL/L (ref 20–31)
CREAT BLD-MCNC: 1.2 MG/DL (ref 0.6–1.3)
GFR SERPL CREATININE-BSD FRML MDRD: 59 ML/MIN/1.73
GLUCOSE BLD-MCNC: 89 MG/DL (ref 70–100)
INR PPP: 1.12
POTASSIUM BLD-SCNC: 4 MMOL/L (ref 3.5–5.5)
PROTHROMBIN TIME: 12.2 SECONDS (ref 9.6–11.5)
SODIUM BLD-SCNC: 137 MMOL/L (ref 132–146)

## 2017-09-14 PROCEDURE — 99239 HOSP IP/OBS DSCHRG MGMT >30: CPT | Performed by: NURSE PRACTITIONER

## 2017-09-14 PROCEDURE — 97110 THERAPEUTIC EXERCISES: CPT

## 2017-09-14 PROCEDURE — 85610 PROTHROMBIN TIME: CPT | Performed by: INTERNAL MEDICINE

## 2017-09-14 PROCEDURE — 80048 BASIC METABOLIC PNL TOTAL CA: CPT | Performed by: INTERNAL MEDICINE

## 2017-09-14 PROCEDURE — 97116 GAIT TRAINING THERAPY: CPT

## 2017-09-14 RX ORDER — WARFARIN SODIUM 2 MG/1
4 TABLET ORAL
Status: DISCONTINUED | OUTPATIENT
Start: 2017-09-14 | End: 2017-09-14 | Stop reason: HOSPADM

## 2017-09-14 RX ORDER — WARFARIN SODIUM 2 MG/1
4 TABLET ORAL
Status: DISCONTINUED | OUTPATIENT
Start: 2017-09-14 | End: 2017-09-14

## 2017-09-14 RX ORDER — OXYCODONE AND ACETAMINOPHEN 10; 325 MG/1; MG/1
1 TABLET ORAL EVERY 4 HOURS PRN
Qty: 18 TABLET | Refills: 0 | Status: SHIPPED | OUTPATIENT
Start: 2017-09-14 | End: 2017-09-23

## 2017-09-14 RX ORDER — CEFDINIR 300 MG/1
300 CAPSULE ORAL 2 TIMES DAILY
Qty: 8 CAPSULE | Refills: 0
Start: 2017-09-14

## 2017-09-14 RX ORDER — WARFARIN SODIUM 3 MG/1
3 TABLET ORAL
Status: DISCONTINUED | OUTPATIENT
Start: 2017-09-15 | End: 2017-09-14

## 2017-09-14 RX ADMIN — OXYCODONE HYDROCHLORIDE AND ACETAMINOPHEN 1 TABLET: 10; 325 TABLET ORAL at 04:02

## 2017-09-14 RX ADMIN — LISINOPRIL 10 MG: 10 TABLET ORAL at 09:17

## 2017-09-14 RX ADMIN — FINASTERIDE 5 MG: 5 TABLET, FILM COATED ORAL at 09:17

## 2017-09-14 RX ADMIN — OXYCODONE HYDROCHLORIDE AND ACETAMINOPHEN 1 TABLET: 10; 325 TABLET ORAL at 09:29

## 2017-09-14 RX ADMIN — BUPROPION HYDROCHLORIDE 150 MG: 150 TABLET, EXTENDED RELEASE ORAL at 09:17

## 2017-09-14 RX ADMIN — FUROSEMIDE 40 MG: 40 TABLET ORAL at 09:17

## 2017-09-14 RX ADMIN — OXYBUTYNIN CHLORIDE 5 MG: 5 TABLET ORAL at 09:17

## 2017-09-14 RX ADMIN — POTASSIUM CHLORIDE 10 MEQ: 750 CAPSULE, EXTENDED RELEASE ORAL at 09:17

## 2017-09-14 NOTE — PLAN OF CARE
Problem: Patient Care Overview (Adult)  Goal: Plan of Care Review  Outcome: Unable to achieve outcome(s) by discharge Date Met:  09/14/17 09/14/17 1105   Patient Care Overview   Progress improving   Outcome Evaluation   Outcome Summary/Follow up Plan Pt has met transfer and bed mobility goal, partially achieved gait goal, anticipate further progress at snf for rehab         Problem: Inpatient Physical Therapy  Goal: Bed Mobility Goal LTG- PT  Outcome: Outcome(s) achieved Date Met:  09/14/17 09/14/17 1105   Bed Mobility PT LTG   Bed Mobility PT LTG, Outcome goal met       Goal: Transfer Training Goal 1 LTG- PT  Outcome: Outcome(s) achieved Date Met:  09/14/17 09/14/17 1105   Transfer Training PT LTG   Transfer Training PT LTG, Outcome goal met       Goal: Gait Training Goal LTG- PT  Outcome: Unable to achieve outcome(s) by discharge Date Met:  09/14/17 09/14/17 1105   Gait Training PT LTG   Gait Training Goal PT LTG, Outcome goal partially met

## 2017-09-14 NOTE — DISCHARGE PLACEMENT REQUEST
"Questions? Call me, Jazmine Matta, RN Case Manager [phone]      Arthur Morel (77 y.o. Male)     Date of Birth Social Security Number Address Home Phone MRN    1940  [address] [address] [facility] [phone] [id number]    Mu-ism Marital Status          Zoroastrian        Admission Date Admission Type Admitting Provider Attending Provider Department, Room/Bed    9/11/17 Emergency Pablo Sinclair MD Dossett, Lee M, MD [facility] 2G, S224/1    Discharge Date Discharge Disposition Discharge Destination         Skilled Nursing Facility (DC - External)             Attending Provider: Pablo Sinclair MD     Allergies:  Bactrim [Sulfamethoxazole-trimethoprim], Tetracyclines & Related    Isolation:  None   Infection:  None   Code Status:  Conditional    Ht:  75.5\" (191.8 cm)   Wt:  305 lb 8 oz (139 kg)    Admission Cmt:  None   Principal Problem:  Acute renal failure [N17.9]                 Active Insurance as of 9/11/2017     Primary Coverage     Payor Plan Insurance Group Employer/Plan Group    MEDICARE MEDICARE A ONLY      Payor Plan Address Payor Plan Phone Number Effective From Effective To    [address] [phone] 5/1/2006     [place] [address]       Subscriber Name Subscriber Birth Date Member ID       ARTHUR MOREL 1940 [id number]           Secondary Coverage     Payor Plan Insurance Group Employer/Plan Group    ANTHEM BLUE CROSS ANTHEM BLUE CROSS BLUE SHIELD PPO [id number]     Payor Plan Address Payor Plan Phone Number Effective From Effective To    [address] [phone] 1/1/2015     [place] [address]       Subscriber Name Subscriber Birth Date Member ID       ARTHUR MOREL W 1940 [id number]                 Emergency Contacts      (Rel.) Home Phone Work Phone Mobile Phone    Lei Morel (Son) [phone] -- --    MarcFelipe (Son) [phone] -- --               Discharge Summary      Zita Browne, " APRN at 9/14/2017 11:29 AM              [facility] Medicine Services  DISCHARGE SUMMARY       Date of Admission: 9/11/2017  Date of Discharge:  9/14/2017  Primary Care Physician: Edgardo Gomez MD  Consulting Physician(s)     Provider Relationship    Sonny Mckinney MD Consulting Physician          Discharge Diagnoses:  Active Hospital Problems (** Indicates Principal Problem)    Diagnosis Date Noted   • **Acute renal failure [N17.9] 09/11/2017   • Prostatitis [N41.9] 09/12/2017   • Obstructive uropathy [N13.9] 09/11/2017   • Supratherapeutic INR [R79.1] 09/11/2017   • Thrombocytosis [D47.3] 09/11/2017   • Elevated sed rate [R70.0] 09/11/2017   • Elevated LFTs [R79.89] 09/11/2017   • CKD (chronic kidney disease) [N18.9]    • PE (pulmonary thromboembolism) [I26.99]    • BPH (benign prostatic hyperplasia) [N40.0]    • DVT (deep venous thrombosis) [I82.409]    • Hypertension [I10]       Resolved Hospital Problems    Diagnosis Date Noted Date Resolved   No resolved problems to display.       Presenting Problem/History of Present Illness  Acute renal failure, unspecified acute renal failure type [N17.9]  Acute renal failure, unspecified acute renal failure type [N17.9]     Chief Complaint on Day of Discharge: FU acute renal failure, urinary retention    History of Present Illness on Day of Discharge:   Patient resting comfortably in bed.  No new complaints.  Ready for transfer to rehab.      Hospital Course  Patient is a 77 y.o. 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  He was being treated for a UTI with cipro and his symptoms of dysuria had improved.  He reported a decrease in urine output over several days.  He presented to the ED with inability to pass any urine at all.  ED staff was unable to pass a navarro catheter, so Dr. Mckinney came in and did serial  dilations and was able to pass a #16 coude catheter.  He was noted to be supratherapeutic at the time of evaluation with an INR of 5.22.  He was admitted to hospital medicine for further evaluation.  He was switched to IV rocephin due to an DANIELITO that he also presented with.  He has maintained his navarro cath and will be discharged with this in place.  He will need to follow up with Dr. Mckinney in 1-2 weeks and keep his navarro in place until this visit.  He has been accepted to [facility] for rehab and is now medically stable for transfer.  He will be discharged on oral pain medications.  His rocephin will be switched to cefdinir for a total of 7 days.           Procedures Performed         Consults:   Consults     Date and Time Order Name Status Description    9/11/2017 1900 Inpatient Consult to Urology Completed           Pertinent Test Results:  Lab Results   Component Value Date    WBC 8.17 09/13/2017    HGB 12.3 (L) 09/13/2017    HCT 39.2 09/13/2017    MCV 92.5 09/13/2017     09/13/2017     Lab Results   Component Value Date    GLUCOSE 89 09/14/2017    CALCIUM 7.9 (L) 09/14/2017     09/14/2017    K 4.0 09/14/2017    CO2 25.0 09/14/2017     09/14/2017    BUN 16 09/14/2017    CREATININE 1.20 09/14/2017    EGFRIFNONA 59 (L) 09/14/2017    BCR 13.3 09/14/2017    ANIONGAP 6.0 09/14/2017     Imaging Results (last 7 days)     Procedure Component Value Units Date/Time    XR Chest 1 View [[id number]] Collected:  09/11/17 1327     Updated:  09/11/17 1605    Narrative:       EXAMINATION: XR CHEST 1 VW- 09/11/2017     INDICATION: chills      COMPARISON: NONE     FINDINGS: Portable chest reveals heart to be enlarged. Calcified  granuloma identified left lung base. Underlying chronic changes seen  within the lung fields bilaterally. Degenerative changes seen within the  spine. Vascular calcifications identified.           Impression:       No acute cardiopulmonary disease.     D:  09/11/2017  E:  09/11/2017     This  report was finalized on 9/11/2017 4:03 PM by Dr. Inna West MD.       CT Abdomen Pelvis Without Contrast [[id number]] Collected:  09/11/17 1533     Updated:  09/11/17 1606    Narrative:       EXAMINATION: CT ABDOMEN PELVIS WO CONTRAST- 09/11/2017     INDICATION: Rule out fistula     TECHNIQUE: Multiple axial CT imaging was obtained of the abdomen and  pelvis from the lung bases through the pubic symphysis following the  administration of rectal contrast.     The radiation dose reduction device was turned on for each scan per the  ALARA (As Low as Reasonably Achievable) protocol.     COMPARISON: 01/24/2016     FINDINGS:      ABDOMEN: The lung bases are grossly clear. Calcified granuloma  identified within the right lower lobe. Liver is homogeneous in  appearance with no stones seen in the gallbladder. Moderate dilatation  identified of the renal collecting systems bilaterally as well as both  ureters. There is mild distention of the bladder. Irregularity  identified within the prostate. Bladder outlet obstruction cannot be  completely excluded. Clinical correlation is needed. There is rectal  contrast seen within the rectum. No abnormal mass or fluid collections  identified. No definite fistula identified. There is some air seen in  the rectal vault. Spleen is unremarkable. The distal colon is  unremarkable. Vascular calcifications seen within the abdominal aorta  and iliac vessels. Small infrarenal abdominal aortic aneurysm stable and  unchanged. No free fluid or free air.     PELVIS: The pelvic organs are unremarkable. The pelvic portion of the  gastrointestinal tract again reveal mild heterogeneous enlargement of  the prostate with some calcification. There is distal ureteral  distention. Small ventral abdominal wall hernia containing mesenteric  fat only. No free fluid or free air. No abnormal mass or fluid  collections identified. The bony structures are unremarkable.       Impression:       1.  New  "moderate obstruction of the kidneys bilaterally as well as the  ureters to the bladder. No obstructing lesion identified with  irregularity identified of the prostate. Bladder outlet obstruction  cannot be excluded.  2. Contrast seen within the rectum with no definite evidence of  extravasation or fistulization. Clinical correlation is needed.  3. Ventral abdominal wall hernia containing mesenteric fat only.     D:  09/11/2017  E:  09/11/2017     This report was finalized on 9/11/2017 4:04 PM by Dr. Inna West MD.           Cultures:  Blood Culture   Date Value Ref Range Status   09/11/2017 No growth at 2 days  Preliminary   09/11/2017 No growth at 2 days  Preliminary     Urine Culture   Date Value Ref Range Status   09/11/2017 50,000-60,000 CFU/mL Normal Urogenital Joseline  Final         Condition on Discharge:  Stable    Physical Exam on Discharge:/65 (BP Location: Right arm, Patient Position: Lying)  Pulse 89  Temp 97.9 °F (36.6 °C) (Oral)   Resp 18  Ht 75.5\" (191.8 cm)  Wt (!) 305 lb 8 oz (139 kg)  SpO2 92%  BMI 37.68 kg/m2  Physical Exam    General: Resting in bed, NAD, no family at bedside  HEENT: Normocephalic, mucous membranes moist, pupils equal  Neck: Supple, trachea midline  Cardiovascular: Regular rate and rhythm, S1-S2, no murmur  Respiratory: Clear to auscultation bilaterally, even unlabored breathing on room air  Abdomen: Soft, nontender, nondistended, bowel sounds +  :  Cummins cath in place with clear yellow urine  Skin: Clean, dry, intact, no lesions or sores  Extremities: ZHU, Symmetrical, pulses +, no edema noted  Neuro: Alert, oriented ×4, appropriate and cooperative,  and pedal pushes equal, sensation intact    Discharge Disposition  Skilled Nursing Facility (DC - External)    Discharge Medications   Arthur Tran   Home Medication Instructions TONY:[id number]    Printed on:09/14/17 1129   Medication Information                      buPROPion SR (WELLBUTRIN SR) 150 " MG 12 hr tablet  Take 150 mg by mouth 2 (Two) Times a Day.             cefdinir (OMNICEF) 300 MG capsule  Take 1 capsule by mouth 2 (Two) Times a Day.             finasteride (PROSCAR) 5 MG tablet  Take 5 mg by mouth Daily.             furosemide (LASIX) 40 MG tablet  Take 40 mg by mouth Daily.             lisinopril (PRINIVIL,ZESTRIL) 10 MG tablet  Take 10 mg by mouth Daily.             oxybutynin (DITROPAN) 5 MG tablet  Take 5 mg by mouth 2 (Two) Times a Day.             oxyCODONE-acetaminophen (PERCOCET)  MG per tablet  Take 1 tablet by mouth Every 4 (Four) Hours As Needed for Moderate Pain  for up to 9 days.             potassium chloride (MICRO-K) 8 MEQ CR capsule  Take 8 mEq by mouth 2 (Two) Times a Day With Meals.             tamsulosin (FLOMAX) 0.4 MG capsule 24 hr capsule  Take 0.4 mg by mouth Every Night.             warfarin (COUMADIN) 4 MG tablet  Take 4 mg by mouth Daily.                 Discharge Diet:   Diet Instructions     Diet: Cardiac; Thin       Discharge Diet:  Cardiac   Fluid Consistency:  Thin                 Discharge Care Plan / Instructions:    Activity at Discharge:   Activity Instructions     Activity as Tolerated                     Follow-up Appointments  No future appointments.  Additional Instructions for the Follow-ups that You Need to Schedule     Discharge Follow-up with PCP    As directed    Follow Up Details:  once discharged from rehab   Has the patient’s follow-up appointment been scheduled and documented in the discharge navigator?:  Patient to schedule, documented in the follow-up section       Discharge Follow-up with Specialty    As directed    Specialty:  Dr. Mckinney   Follow Up Details:  1-2 weeks   Has the patient’s follow-up appointment been scheduled and documented in the discharge navigator?:  Yes, documented in the appointment section                 Test Results Pending at Discharge   Order Current Status    Blood Culture Preliminary result    Blood Culture  Preliminary result           TROY Pantoja 09/14/17 11:29 AM    Time: Discharge 45 min    Please note that portions of this note may have been completed with a voice recognition program. Efforts were made to edit the dictations, but occasionally words are mistranscribed.         Electronically signed by TROY Pantoja at 9/14/2017 11:41 AM

## 2017-09-14 NOTE — PROGRESS NOTES
Continued Stay Note   Elia     Patient Name: Arthur Tran  MRN: [id number]  Today's Date: 9/14/2017    Admit Date: 9/11/2017          Discharge Plan       09/14/17 1149    Case Management/Social Work Plan    Plan Alin    Patient/Family In Agreement With Plan yes    Final Note    Final Note Faxed discharge summary to Facility via [employer] to [phone].              Discharge Codes     None        Expected Discharge Date and Time     Expected Discharge Date Expected Discharge Time    Sep 14, 2017             Georgia Matta RN

## 2017-09-14 NOTE — THERAPY DISCHARGE NOTE
Acute Care - Physical Therapy Treatment Note/Discharge  [facility]     Patient Name: Arthur Tran  : 1940  MRN: [id number]  Today's Date: 2017  Onset of Illness/Injury or Date of Surgery Date: 17  Date of Referral to PT: 17  Referring Physician: MD Yusuf    Admit Date: 2017    Visit Dx:    ICD-10-CM ICD-9-CM   1. Acute renal failure, unspecified acute renal failure type N17.9 584.9   2. Bilateral hydronephrosis N13.30 591   3. Elevated INR R79.1 790.92   4. Adult failure to thrive syndrome R62.7 783.7   5. Bladder outlet obstruction N32.0 596.0   6. Impaired mobility and ADLs Z74.09 799.89   7. Impaired functional mobility, balance, gait, and endurance Z74.09 V49.89     Patient Active Problem List   Diagnosis   • DVT (deep venous thrombosis)   • PE (pulmonary thromboembolism)   • Prostatitis   • Hypertension   • CKD (chronic kidney disease)   • BPH (benign prostatic hyperplasia)   • Acute renal failure   • Obstructive uropathy   • Supratherapeutic INR   • Thrombocytosis   • Elevated sed rate   • Elevated LFTs   • Prostatitis       Physical Therapy Education     Title: PT OT SLP Therapies (Done)     Topic: Physical Therapy (Done)     Point: Mobility training (Done)    Learning Progress Summary    Learner Readiness Method Response Comment Documented by Status   Patient Acceptance E VU discussed d/c planning  17 1104 Done    Acceptance E VU   17 1515 Done    Acceptance E NR   17 1254 Active               Point: Home exercise program (Done)    Learning Progress Summary    Learner Readiness Method Response Comment Documented by Status   Patient Acceptance E VU discussed d/c planning  17 1104 Done    Acceptance E VU   17 1515 Done    Acceptance E NR   17 1254 Active               Point: Body mechanics (Done)    Learning Progress Summary    Learner Readiness Method Response Comment Documented by Status   Patient Acceptance E VU discussed d/c  planning  09/14/17 1104 Done    Acceptance E VU  CD 09/13/17 1515 Done    Acceptance E NR  GRIFFIN 09/13/17 1254 Active               Point: Precautions (Done)    Learning Progress Summary    Learner Readiness Method Response Comment Documented by Status   Patient Acceptance E VU discussed d/c planning  09/14/17 1104 Done    Acceptance E VU  CD 09/13/17 1515 Done    Acceptance E NR  GRIFFIN 09/13/17 1254 Active                      User Key     Initials Effective Dates Name Provider Type Discipline     06/19/15 -  Monica Mojica, PT Physical Therapist PT     06/19/15 -  Leigh Roa, PT Physical Therapist PT    CD 06/16/16 -  Nanci Cruz, RN Registered Nurse Nurse                    IP PT Goals       09/14/17 1105 09/13/17 1255       Bed Mobility PT LTG    Bed Mobility PT LTG, Date Established  09/13/17  -GRIFFIN     Bed Mobility PT LTG, Time to Achieve  2 wks  -GRIFFIN     Bed Mobility PT LTG, Activity Type  supine to sit/sit to supine  -GRIFFIN     Bed Mobility PT LTG, [place] Level  independent  -GRIFFIN     Bed Mobility PT LTG, Outcome goal met  -KM goal ongoing  -GRIFFIN     Transfer Training PT LTG    Transfer Training PT LTG, Date Established  09/13/17  -GRIFFIN     Transfer Training PT LTG, Time to Achieve  2 wks  -GRIFFIN     Transfer Training PT LTG, Activity Type  sit to stand/stand to sit  -GRIFFIN     Transfer Training PT LTG, [place] Level  independent  -GRIFFIN     Transfer Training PT LTG, Outcome goal met  -KM goal ongoing  -GRIFFIN     Gait Training PT LTG    Gait Training Goal PT LTG, Date Established  09/13/17  -GRIFFIN     Gait Training Goal PT LTG, Time to Achieve  2 wks  -GRIFFIN     Gait Training Goal PT LTG, [place] Level  independent  -GRIFFIN     Gait Training Goal PT LTG, Assist Device  walker, rolling  -GRIFFIN     Gait Training Goal PT LTG, Distance to Achieve  250  -GRIFFIN     Gait Training Goal PT LTG, Outcome goal partially met  -KM goal ongoing  -GRIFFIN       User Key  (r) = Recorded By, (t) = Taken By, (c) = Cosigned By     Initials Name Provider Type    GRIFFIN Mojica, PT Physical Therapist    KM Leigh Roa, PT Physical Therapist              Adult Rehabilitation Note       09/14/17 1012          Rehab Assessment/Intervention    Discipline physical therapist  -KM      Document Type therapy note (daily note);discharge summary  -KM      Subjective Information agree to therapy  -KM      Patient Effort, Rehab Treatment adequate  -KM      Precautions/Limitations fall precautions  -KM      Recorded by [KM] Leigh Roa PT      Pain Assessment    Pain Assessment No/denies pain  -KM      Recorded by [KM] Leigh Roa, PT      Cognitive Assessment/Intervention    Current Cognitive/Communication Assessment functional  -KM      Orientation Status oriented x 4  -KM      Follows Commands/Answers Questions able to follow single-step instructions;100% of the time  -KM      Personal Safety WNL/WFL  -KM      Personal Safety Interventions fall prevention program maintained  -KM      Recorded by [KM] Leigh Roa, VIRAJ      Bed Mobility, Assessment/Treatment    Bed Mobility, Assistive Device bed rails;head of bed elevated  -KM      Bed Mob, Supine to Sit, [place] conditional independence  -KM      Recorded by [KM] Leigh Roa, VIRAJ      Transfer Assessment/Treatment    Transfers, Sit-Stand [place] supervision required  -KM      Transfers, Stand-Sit [place] supervision required  -KM      Recorded by [KM] Leigh Roa, PT      Gait Assessment/Treatment    Gait, [place] Level contact guard assist;2 person assist required  -KM      Gait, Assistive Device rolling walker  -KM      Gait, Distance (Feet) 125  -KM      Gait, Gait Deviations jordi decreased;step length decreased  -KM      Gait, Impairments impaired balance  -KM      Recorded by [KM] Leigh Roa, VIRAJ      Therapy Exercises    Bilateral Lower Extremities AROM:;10 reps;standing;ankle pumps/circles;hip  flexion;LAQ  -KM      Recorded by [KM] Leigh Roa PT      Positioning and Restraints    Pre-Treatment Position in bed  -KM      Post Treatment Position chair  -KM      In Chair sitting;call light within reach;encouraged to call for assist;exit alarm on  -KM      Recorded by [KM] Leigh Roa PT        User Key  (r) = Recorded By, (t) = Taken By, (c) = Cosigned By    Initials Name Effective Dates    KM Leigh Roa PT 06/19/15 -           PT Recommendation and Plan  Anticipated Discharge Disposition: skilled nursing facility  PT Frequency: daily, per priority policy  Plan of Care Review  Progress: improving  Outcome Summary/Follow up Plan: Pt has met transfer and bed mobility goal, partially achieved gait goal, anticipate further progress at snf for rehab          Outcome Measures       09/14/17 1012 09/13/17 1100 09/13/17 1055    How much help from another person do you currently need...    Turning from your back to your side while in flat bed without using bedrails? 4  -KM 4  -GRIFFIN     Moving from lying on back to sitting on the side of a flat bed without bedrails? 4  -KM 4  -GRIFFIN     Moving to and from a bed to a chair (including a wheelchair)? 3  -KM 3  -GRIFFIN     Standing up from a chair using your arms (e.g., wheelchair, bedside chair)? 3  -KM 3  -GRIFFIN     Climbing 3-5 steps with a railing? 3  -KM 2  -GRIFFIN     To walk in hospital room? 3  -KM 3  -GRIFFIN     AM-PAC 6 Clicks Score 20  -KM 19  -GRIFFIN     How much help from another is currently needed...    Putting on and taking off regular lower body clothing?   3  -AC    Bathing (including washing, rinsing, and drying)   2  -AC    Toileting (which includes using toilet bed pan or urinal)   2  -AC    Putting on and taking off regular upper body clothing   3  -AC    Taking care of personal grooming (such as brushing teeth)   4  -AC    Eating meals   4  -AC    Score   18  -AC    Functional Assessment    Outcome Measure Options AM-PAC 6 Clicks Basic  Mobility (PT)  -KM AM-PAC 6 Clicks Basic Mobility (PT)  -GRIFFIN AM-PAC 6 Clicks Daily Activity (OT)  -AC      User Key  (r) = Recorded By, (t) = Taken By, (c) = Cosigned By    Initials Name Provider Type    GRIFFIN Monica Mojica, PT Physical Therapist    AC Clemencia Harper, OT Occupational Therapist    KM Leigh Roa, PT Physical Therapist           Time Calculation:         PT Charges       09/14/17 1108          Time Calculation    Start Time 1012  -KM      PT Received On 09/14/17  -KM      PT Goal Re-Cert Due Date 09/23/17  -KM      Time Calculation- PT    Total Timed Code Minutes- PT 24 minute(s)  -KM        User Key  (r) = Recorded By, (t) = Taken By, (c) = Cosigned By    Initials Name Provider Type    GUEVARA Roa, PT Physical Therapist          Therapy Charges for Today     Code Description Service Date Service Provider Modifiers Qty    50639907936 HC GAIT TRAINING EA 15 MIN 9/14/2017 Leigh Roa, PT GP 1    24784797065 HC PT THER SUPP EA 15 MIN 9/14/2017 Leigh Roa, PT GP 2    78558902542 HC PT THER PROC EA 15 MIN 9/14/2017 Leigh Roa, PT GP 1          PT G-Codes  Outcome Measure Options: AM-PAC 6 Clicks Basic Mobility (PT)    PT Discharge Summary  Anticipated Discharge Disposition: skilled nursing facility  Reason for Discharge: Discharge from facility  Outcomes Achieved: Patient able to partially acheive established goals  Discharge Destination: SNF    Leigh Roa, PT  9/14/2017

## 2017-09-14 NOTE — PROGRESS NOTES
"Pharmacy Consult  -  Warfarin    Arthur Tran is a  77 y.o. male admitted for acute renal failure due to post-obstructive uropathy.  Hematuria complicated case secondary to supratherapeutic INR.  Warfarin re-initiated on 9/13.    Height - 75.5\" (191.8 cm)  Weight - (!) 305 lb 8 oz (139 kg)    Consulting Provider: - Dr. Sinclair  Indication: - PE/DVT  Goal INR: -  2 - 3  Home Regimen:   - 4 mg daily    Bridge Therapy: No    Drug-Drug Interactions with current regimen:       Warfarin Dosing During Admission:    Date  9/11 9/12 9/13 9/14        INR  5.22 1.83 -- 1.12        Dose  none HOLD 4mg (4mg)             Labs:    Results from last 7 days   Lab Units 09/14/17 0659 09/13/17  0631 09/12/17  0941 09/11/17  1152   INR  1.12 --  1.83 5.22   HEMOGLOBIN g/dL --  12.3* 12.8* 14.3   HEMATOCRIT % --  39.2 39.8 43.6   PLATELETS 10*3/mm3 --  390 444 491*     Results from last 7 days   Lab Units 09/14/17  0659 09/13/17  0631 09/12/17  0941 09/11/17  1152   SODIUM mmol/L 137 139 141 142   POTASSIUM mmol/L 4.0 4.3 3.9 4.2   CHLORIDE mmol/L 106 105 108 107   CO2 mmol/L 25.0 29.0 31.0 27.0   BUN mg/dL 16 21 26* 26*   CREATININE mg/dL 1.20 1.30 1.50* 1.50*   CALCIUM mg/dL 7.9* 7.9* 8.4* 9.6   BILIRUBIN mg/dL --  --  --  0.4   ALK PHOS U/L --  --  --  80   ALT (SGPT) U/L --  --  --  76*   AST (SGOT) U/L --  --  --  47*   GLUCOSE mg/dL 89 91 116* 111*     Current dietary intake:  % of meals per charting    Assessment/Plan:   1.  Warfarin INR supratherapeutic on admission secondary to acute renal failure and initiation of oral ciprofloxacin for UTI treatment.  I believe these two issues resulted in supratherapeutic INR.  Reinitiate warfarin 4mg daily as per Dr. Sinclair.  2.  Daily PT/INR while inpatient.  Monitor for S/Sx of bleeding/clotting.  3.  Defer bridge therapy to attending MD.  If desired, recommend enoxaparin 140 mg SC q12h while INR subtherapeutic.  4.  Pharmacy will continue to follow and adjust dose based on " INR trend.    Discharge Recommendation:  Due to factors above, I believe home dose of 4mg daily should be restarted and continued.  Recheck of INR in 3-5 days after discharge.  If desired by discharging provider, recommend enoxaparin 140mg SC q12h for bridge therapy, but VTE in distant past.  Defer to provider on appropriateness of need of bridge therapy.     Thanks,  Jordan Ann, PharmD, BCPS  #[id number]  09/14/17  8:54 AM

## 2017-09-14 NOTE — PLAN OF CARE
Problem: Fall Risk (Adult)  Goal: Identify Related Risk Factors and Signs and Symptoms  Outcome: Ongoing (interventions implemented as appropriate)    09/14/17 1009   Fall Risk   Fall Risk: Related Risk Factors history of falls   Fall Risk: Signs and Symptoms presence of risk factors

## 2017-09-14 NOTE — PROGRESS NOTES
"Daily Progress Note    Patient: [facility] Day: 2    Subjective: Pt with navarro discomfort      Objective:     /61 (BP Location: Left arm, Patient Position: Lying)  Pulse 67  Temp 97.9 °F (36.6 °C) (Oral)   Resp 18  Ht 75.5\" (191.8 cm)  Wt (!) 305 lb 8 oz (139 kg)  SpO2 94%  BMI 37.68 kg/m2      Intake/Output Summary (Last 24 hours) at 09/14/17 0813  Last data filed at 09/14/17 0400   Gross per 24 hour   Intake             1080 ml   Output             4450 ml   Net            -3370 ml       Review of Systems:    Physical Exam:   General Appearance: alert, appears stated age and cooperative  Head: normocephalic, without obvious abnormality and atraumatic  Lungs respirations regular  Abdomen no masses and soft non-tender      Extremities moves extremities well, no edema, no cyanosis and no redness      Lab Results (last 24 hours)     Procedure Component Value Units Date/Time    Blood Culture [[id number]]  (Normal) Collected:  09/11/17 1155    Specimen:  Blood from Arm, Right Updated:  09/13/17 1301     Blood Culture No growth at 2 days    Blood Culture [[id number]]  (Normal) Collected:  09/11/17 1150    Specimen:  Blood from Arm, Right Updated:  09/13/17 1301     Blood Culture No growth at 2 days    Urine Culture [[id number]] Collected:  09/11/17 1231    Specimen:  Urine from Urine, Clean Catch Updated:  09/13/17 1406     Urine Culture 50,000-60,000 CFU/mL Normal Urogenital Joseline    Protime-INR [[id number]]  (Abnormal) Collected:  09/14/17 0659    Specimen:  Blood Updated:  09/14/17 0758     Protime 12.2 (H) Seconds      INR 1.12    Narrative:       Therapeutic Ranges for INR: 2.0-3.0 (PT 20-30)                              2.5-3.5 (PT 25-34)    Basic Metabolic Panel [[id number]]  (Abnormal) Collected:  09/14/17 0659    Specimen:  Blood Updated:  09/14/17 0759     Glucose 89 mg/dL      BUN 16 mg/dL      Creatinine 1.20 mg/dL      Sodium 137 mmol/L      Potassium 4.0 mmol/L      Chloride 106 mmol/L "      CO2 25.0 mmol/L      Calcium 7.9 (L) mg/dL      eGFR Non African Amer 59 (L) mL/min/1.73      BUN/Creatinine Ratio 13.3     Anion Gap 6.0 mmol/L     Narrative:       [employer] Guidelines    Stage     Description        GFR  1         Normal or High     90+  2         Mild decrease      60-89  3         Moderate decrease  30-59  4         Severe decrease    15-29  5         Kidney failure     <15          Imaging Results (last 24 hours)     ** No results found for the last 24 hours. **          Assessment/Plan: Urethral stricture/BPH. Home with navarro.  F/u with me 1-2 wks.         Patient Active Problem List   Diagnosis   • DVT (deep venous thrombosis)   • PE (pulmonary thromboembolism)   • Prostatitis   • Hypertension   • CKD (chronic kidney disease)   • BPH (benign prostatic hyperplasia)   • Acute renal failure   • Obstructive uropathy   • Supratherapeutic INR   • Thrombocytosis   • Elevated sed rate   • Elevated LFTs   • Prostatitis       [unfilled]      Sonny Mckinney MD - 9/14/2017, 8:13 AM

## 2017-09-14 NOTE — DISCHARGE SUMMARY
[facility] Medicine Services  DISCHARGE SUMMARY       Date of Admission: 9/11/2017  Date of Discharge:  9/14/2017  Primary Care Physician: Edgardo Gomez MD  Consulting Physician(s)     Provider Relationship    Sonny Mckinney MD Consulting Physician          Discharge Diagnoses:  Active Hospital Problems (** Indicates Principal Problem)    Diagnosis Date Noted   • **Acute renal failure [N17.9] 09/11/2017   • Prostatitis [N41.9] 09/12/2017   • Obstructive uropathy [N13.9] 09/11/2017   • Supratherapeutic INR [R79.1] 09/11/2017   • Thrombocytosis [D47.3] 09/11/2017   • Elevated sed rate [R70.0] 09/11/2017   • Elevated LFTs [R79.89] 09/11/2017   • CKD (chronic kidney disease) [N18.9]    • PE (pulmonary thromboembolism) [I26.99]    • BPH (benign prostatic hyperplasia) [N40.0]    • DVT (deep venous thrombosis) [I82.409]    • Hypertension [I10]       Resolved Hospital Problems    Diagnosis Date Noted Date Resolved   No resolved problems to display.       Presenting Problem/History of Present Illness  Acute renal failure, unspecified acute renal failure type [N17.9]  Acute renal failure, unspecified acute renal failure type [N17.9]     Chief Complaint on Day of Discharge: FU acute renal failure, urinary retention    History of Present Illness on Day of Discharge:   Patient resting comfortably in bed.  No new complaints.  Ready for transfer to rehab.      Hospital Course  Patient is a 77 y.o. 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  He was being treated for a UTI with cipro and his symptoms of dysuria had improved.  He reported a decrease in urine output over several days.  He presented to the ED with inability to pass any urine at all.  ED staff was unable to pass a navarro catheter, so Dr. Mckinney came in and did serial dilations and was able to pass a #16 coude  catheter.  He was noted to be supratherapeutic at the time of evaluation with an INR of 5.22.  He was admitted to hospital medicine for further evaluation.  He was switched to IV rocephin due to an DANIELITO that he also presented with.  He has maintained his navarro cath and will be discharged with this in place.  He will need to follow up with Dr. Mckinney in 1-2 weeks and keep his navarro in place until this visit.  He has been accepted to [facility] for rehab and is now medically stable for transfer.  He will be discharged on oral pain medications.  His rocephin will be switched to cefdinir for a total of 7 days.           Procedures Performed         Consults:   Consults     Date and Time Order Name Status Description    9/11/2017 1900 Inpatient Consult to Urology Completed           Pertinent Test Results:  Lab Results   Component Value Date    WBC 8.17 09/13/2017    HGB 12.3 (L) 09/13/2017    HCT 39.2 09/13/2017    MCV 92.5 09/13/2017     09/13/2017     Lab Results   Component Value Date    GLUCOSE 89 09/14/2017    CALCIUM 7.9 (L) 09/14/2017     09/14/2017    K 4.0 09/14/2017    CO2 25.0 09/14/2017     09/14/2017    BUN 16 09/14/2017    CREATININE 1.20 09/14/2017    EGFRIFNONA 59 (L) 09/14/2017    BCR 13.3 09/14/2017    ANIONGAP 6.0 09/14/2017     Imaging Results (last 7 days)     Procedure Component Value Units Date/Time    XR Chest 1 View [[id number]] Collected:  09/11/17 1327     Updated:  09/11/17 1605    Narrative:       EXAMINATION: XR CHEST 1 VW- 09/11/2017     INDICATION: chills      COMPARISON: NONE     FINDINGS: Portable chest reveals heart to be enlarged. Calcified  granuloma identified left lung base. Underlying chronic changes seen  within the lung fields bilaterally. Degenerative changes seen within the  spine. Vascular calcifications identified.           Impression:       No acute cardiopulmonary disease.     D:  09/11/2017  E:  09/11/2017     This report was finalized on 9/11/2017 4:03 PM  by Dr. Inna West MD.       CT Abdomen Pelvis Without Contrast [[id number]] Collected:  09/11/17 1533     Updated:  09/11/17 1606    Narrative:       EXAMINATION: CT ABDOMEN PELVIS WO CONTRAST- 09/11/2017     INDICATION: Rule out fistula     TECHNIQUE: Multiple axial CT imaging was obtained of the abdomen and  pelvis from the lung bases through the pubic symphysis following the  administration of rectal contrast.     The radiation dose reduction device was turned on for each scan per the  ALARA (As Low as Reasonably Achievable) protocol.     COMPARISON: 01/24/2016     FINDINGS:      ABDOMEN: The lung bases are grossly clear. Calcified granuloma  identified within the right lower lobe. Liver is homogeneous in  appearance with no stones seen in the gallbladder. Moderate dilatation  identified of the renal collecting systems bilaterally as well as both  ureters. There is mild distention of the bladder. Irregularity  identified within the prostate. Bladder outlet obstruction cannot be  completely excluded. Clinical correlation is needed. There is rectal  contrast seen within the rectum. No abnormal mass or fluid collections  identified. No definite fistula identified. There is some air seen in  the rectal vault. Spleen is unremarkable. The distal colon is  unremarkable. Vascular calcifications seen within the abdominal aorta  and iliac vessels. Small infrarenal abdominal aortic aneurysm stable and  unchanged. No free fluid or free air.     PELVIS: The pelvic organs are unremarkable. The pelvic portion of the  gastrointestinal tract again reveal mild heterogeneous enlargement of  the prostate with some calcification. There is distal ureteral  distention. Small ventral abdominal wall hernia containing mesenteric  fat only. No free fluid or free air. No abnormal mass or fluid  collections identified. The bony structures are unremarkable.       Impression:       1.  New moderate obstruction of the kidneys  "bilaterally as well as the  ureters to the bladder. No obstructing lesion identified with  irregularity identified of the prostate. Bladder outlet obstruction  cannot be excluded.  2. Contrast seen within the rectum with no definite evidence of  extravasation or fistulization. Clinical correlation is needed.  3. Ventral abdominal wall hernia containing mesenteric fat only.     D:  09/11/2017  E:  09/11/2017     This report was finalized on 9/11/2017 4:04 PM by Dr. Inna West MD.           Cultures:  Blood Culture   Date Value Ref Range Status   09/11/2017 No growth at 2 days  Preliminary   09/11/2017 No growth at 2 days  Preliminary     Urine Culture   Date Value Ref Range Status   09/11/2017 50,000-60,000 CFU/mL Normal Urogenital Joseline  Final         Condition on Discharge:  Stable    Physical Exam on Discharge:/65 (BP Location: Right arm, Patient Position: Lying)  Pulse 89  Temp 97.9 °F (36.6 °C) (Oral)   Resp 18  Ht 75.5\" (191.8 cm)  Wt (!) 305 lb 8 oz (139 kg)  SpO2 92%  BMI 37.68 kg/m2  Physical Exam    General: Resting in bed, NAD, no family at bedside  HEENT: Normocephalic, mucous membranes moist, pupils equal  Neck: Supple, trachea midline  Cardiovascular: Regular rate and rhythm, S1-S2, no murmur  Respiratory: Clear to auscultation bilaterally, even unlabored breathing on room air  Abdomen: Soft, nontender, nondistended, bowel sounds +  :  Cummins cath in place with clear yellow urine  Skin: Clean, dry, intact, no lesions or sores  Extremities: ZHU, Symmetrical, pulses +, no edema noted  Neuro: Alert, oriented ×4, appropriate and cooperative,  and pedal pushes equal, sensation intact    Discharge Disposition  Skilled Nursing Facility (DC - External)    Discharge Medications   Arthur Tran   Home Medication Instructions TONY:[id number]    Printed on:09/14/17 1129   Medication Information                      buPROPion SR (WELLBUTRIN SR) 150 MG 12 hr tablet  Take 150 mg by " mouth 2 (Two) Times a Day.             cefdinir (OMNICEF) 300 MG capsule  Take 1 capsule by mouth 2 (Two) Times a Day.             finasteride (PROSCAR) 5 MG tablet  Take 5 mg by mouth Daily.             furosemide (LASIX) 40 MG tablet  Take 40 mg by mouth Daily.             lisinopril (PRINIVIL,ZESTRIL) 10 MG tablet  Take 10 mg by mouth Daily.             oxybutynin (DITROPAN) 5 MG tablet  Take 5 mg by mouth 2 (Two) Times a Day.             oxyCODONE-acetaminophen (PERCOCET)  MG per tablet  Take 1 tablet by mouth Every 4 (Four) Hours As Needed for Moderate Pain  for up to 9 days.             potassium chloride (MICRO-K) 8 MEQ CR capsule  Take 8 mEq by mouth 2 (Two) Times a Day With Meals.             tamsulosin (FLOMAX) 0.4 MG capsule 24 hr capsule  Take 0.4 mg by mouth Every Night.             warfarin (COUMADIN) 4 MG tablet  Take 4 mg by mouth Daily.                 Discharge Diet:   Diet Instructions     Diet: Cardiac; Thin       Discharge Diet:  Cardiac   Fluid Consistency:  Thin                 Discharge Care Plan / Instructions:    Activity at Discharge:   Activity Instructions     Activity as Tolerated                     Follow-up Appointments  No future appointments.  Additional Instructions for the Follow-ups that You Need to Schedule     Discharge Follow-up with PCP    As directed    Follow Up Details:  once discharged from rehab   Has the patient’s follow-up appointment been scheduled and documented in the discharge navigator?:  Patient to schedule, documented in the follow-up section       Discharge Follow-up with Specialty    As directed    Specialty:  Dr. Mckinney   Follow Up Details:  1-2 weeks   Has the patient’s follow-up appointment been scheduled and documented in the discharge navigator?:  Yes, documented in the appointment section                 Test Results Pending at Discharge   Order Current Status    Blood Culture Preliminary result    Blood Culture Preliminary result           Zita  TROY Browne 09/14/17 11:29 AM    Time: Discharge 45 min    Please note that portions of this note may have been completed with a voice recognition program. Efforts were made to edit the dictations, but occasionally words are mistranscribed.

## 2017-09-16 LAB
BACTERIA SPEC AEROBE CULT: NORMAL
BACTERIA SPEC AEROBE CULT: NORMAL

## 2019-05-15 NOTE — THERAPY EVALUATION
Acute Care - Occupational Therapy Initial Evaluation  [facility]     Patient Name: Arthur Tran  : 1940  MRN: [id number]  Today's Date: 2017  Onset of Illness/Injury or Date of Surgery Date: 17  Date of Referral to OT: 17  Referring Physician: MD Paramjit    Admit Date: 2017       ICD-10-CM ICD-9-CM   1. Acute renal failure, unspecified acute renal failure type N17.9 584.9   2. Bilateral hydronephrosis N13.30 591   3. Elevated INR R79.1 790.92   4. Adult failure to thrive syndrome R62.7 783.7   5. Bladder outlet obstruction N32.0 596.0   6. Impaired mobility and ADLs Z74.09 799.89     Patient Active Problem List   Diagnosis   • DVT (deep venous thrombosis)   • PE (pulmonary thromboembolism)   • Prostatitis   • Hypertension   • CKD (chronic kidney disease)   • BPH (benign prostatic hyperplasia)   • Acute renal failure   • Obstructive uropathy   • Supratherapeutic INR   • Thrombocytosis   • Elevated sed rate   • Elevated LFTs   • Prostatitis     Past Medical History:   Diagnosis Date   • BPH (benign prostatic hyperplasia)    • CHF (congestive heart failure)    • CKD (chronic kidney disease)    • DVT (deep venous thrombosis)    • Hypertension    • PE (pulmonary thromboembolism)    • Prostatitis      Past Surgical History:   Procedure Laterality Date   • RHINOPLASTY     • TONSILLECTOMY AND ADENOIDECTOMY      as child          OT ASSESSMENT FLOWSHEET (last 72 hours)      OT Evaluation       17 1055 17 1533 17 1532 17 2100       Rehab Evaluation    Document Type evaluation  -AC        Subjective Information agree to therapy;complains of;pain  -AC        Patient Effort, Rehab Treatment fair  -AC        Symptoms Noted During/After Treatment --   pt agitated with therapy request to ambulate d/t pain  -AC        General Information    Patient Profile Review yes  -AC        Onset of Illness/Injury or Date of Surgery Date 17  -AC        Referring Physician MD Paramjit   -AC        General Observations  Pt received supine in bed. IV intact  -AC        Pertinent History Of Current Problem Pt admit with rectal drainage, weakness x 4-5 days. Acute renal failure; was being treated for UTI prior to admission  -AC        Precautions/Limitations fall precautions   impulsive  -AC        Prior Level of Function independent:;all household mobility;ADL's  -AC        Equipment Currently Used at Home shower chair   has SW, but was not using  -AC none  -PP  none  -KW     Plans/Goals Discussed With patient  -AC        Risks Reviewed patient:;LOB  -AC        Benefits Reviewed patient:;improve function;increase independence;increase strength;increase balance;increase knowledge  -AC        Barriers to Rehab ineffective coping  -AC        Living Environment    Lives With alone  -AC alone  -PP  alone  -KW     Living Arrangements house  -AC house  -PP  house  -KW     Home Accessibility stairs to enter home;tub/shower is not walk in  -AC   no concerns  -KW     Number of Stairs to Enter Home 2   1 in front; 2 steps in back  -AC        Stair Railings at Home    none  -KW     Type of Financial/Environmental Concern    none  -KW     Transportation Available  car;family or friend will provide  -PP  other (see comments)   Pt is unsure of transport home, possibly taxi  -KW     Clinical Impression    Date of Referral to OT 09/11/17  -AC        OT Diagnosis ADL decline  -AC        Patient/Family Goals Statement Pt did not state  -AC        Criteria for Skilled Therapeutic Interventions Met yes;treatment indicated  -AC        Rehab Potential good, to achieve stated therapy goals  -AC        Therapy Frequency daily  -AC        Anticipated Equipment Needs At Discharge front wheeled walker  -AC        Anticipated Discharge Disposition skilled nursing facility  -AC        Functional Level Prior    Ambulation   0-->independent  -PP 0-->independent  -KW     Transferring   0-->independent  -PP 0-->independent  -KW      Toileting   0-->independent  -PP 0-->independent  -KW     Bathing   0-->independent  -PP 0-->independent  -KW     Dressing   0-->independent  -PP 0-->independent  -KW     Eating   0-->independent  -PP 0-->independent  -KW     Communication   0-->understands/communicates without difficulty  -PP 0-->understands/communicates without difficulty  -KW     Swallowing   0-->swallows foods/liquids without difficulty  -PP 0-->swallows foods/liquids without difficulty  -KW     Prior Functional Level Comment    NA  -KW     Pain Assessment    Pain Assessment Antonio-Hopkins FACES  -        Antonio-Baker FACES Pain Rating 4  -AC        Pain Location --   catheter site  -AC        Pain Intervention(s) Repositioned  -        Vision Assessment/Intervention    Visual Impairment WFL  -AC        Cognitive Assessment/Intervention    Current Cognitive/Communication Assessment functional   decreased safety awareness  -        Orientation Status oriented x 4  -AC        Follows Commands/Answers Questions 75% of the time;needs cueing;needs repetition   limited d/t impulsiveness; decr attn to task  -        Personal Safety mild impairment  -        Personal Safety Interventions fall prevention program maintained;gait belt;nonskid shoes/slippers when out of bed  -AC        ROM (Range of Motion)    General ROM no range of motion deficits identified  -AC        MMT (Manual Muscle Testing)    General MMT Assessment upper extremity strength deficits identified  -AC        Bed Mobility, Assessment/Treatment    Bed Mobility, Assistive Device head of bed elevated;bed rails  -AC        Bed Mob, Supine to Sit, [place] verbal cues required;supervision required  -AC        Transfer Assessment/Treatment    Transfers, Bed-Chair [place] verbal cues required;contact guard assist  -AC        Transfers, Sit-Stand [place] verbal cues required;contact guard assist  -AC        Transfers, Stand-Sit [place] verbal cues required;contact guard  assist;2 person assist required  -AC        Transfer, Comment impulsively standing before gait belt applied and reaching for IV pole befiore allowing therapist to place walker in front of pt  -AC        Functional Mobility    Functional Mobility- Ind. Level verbal cues required;contact guard assist;2 person assist required  -AC        Functional Mobility- Device --   gait belt, holding on to iV pole  -AC        Functional Mobility-Distance (Feet) 4  -AC        Functional Mobility- Comment Pt became agitated d/t pain and stated he did not want to attempt to walk further  -AC        Lower Body Dressing Assessment/Training    LB Dressing Assess/Train, Clothing Type donning:;shoes  -AC        LB Dressing Assess/Train, Position sitting  -AC        LB Dressing Assess/Train, [place] supervision required  -AC        General Therapy Interventions    Planned Therapy Interventions ADL retraining;balance training;bed mobility training;strengthening;transfer training  -AC        Positioning and Restraints    Pre-Treatment Position in bed  -AC        Post Treatment Position chair  -AC        In Chair sitting;call light within reach;encouraged to call for assist;exit alarm on  -AC          User Key  (r) = Recorded By, (t) = Taken By, (c) = Cosigned By    Initials Name Effective Dates     Clemencia Harper OT 06/23/15 -     FREDIS Matta RN 03/14/16 -     KW Doreen Rayo RN 04/18/17 -            Occupational Therapy Education     Title: PT OT SLP Therapies (Active)     Topic: Occupational Therapy (Active)     Point: ADL training (Done)    Description: Instruct learner(s) on proper safety adaptation and remediation techniques during self care or transfers.   Instruct in proper use of assistive devices.    Learning Progress Summary    Learner Readiness Method Response Comment Documented by Status   Patient Acceptance E VU Role of OT  09/13/17 1146 Done                      User Key     Initials Effective Dates  Name Provider Type Discipline     06/23/15 -  Clemencia Harper, OT Occupational Therapist OT                  OT Recommendation and Plan  Anticipated Equipment Needs At Discharge: front wheeled walker  Anticipated Discharge Disposition: skilled nursing facility  Planned Therapy Interventions: ADL retraining, balance training, bed mobility training, strengthening, transfer training  Therapy Frequency: daily  Plan of Care Review  Plan Of Care Reviewed With: patient  Outcome Summary/Follow up Plan: OT eval complete.  Pt presents with impulsiveness, decreased strength and balance limiting independence and safety with self cafe and mobiltiy.  Pt will benefit from OT to advance pt toward  PLOF with self care.           OT Goals       09/13/17 1148          Strength OT LTG    Strength Goal OT LTG, Date Established 09/13/17  -      Strength Goal OT LTG, Time to Achieve by discharge  -AC      Strength Goal OT LTG, Measure to Achieve pt will complete B UE strengthening HEP 1 set 15 reps  -AC      Safety Awareness OT LTG    Safety Awareness OT LTG, Date Established 09/13/17  -      Safety Awareness OT LTG, Time to Achieve by discharge  -      Safety Awareness OT LTG, Activity Type good safety awareness;with ADL's  -AC      Toileting OT LTG    Toileting Goal OT LTG, Date Established 09/13/17  -      Toileting Goal OT LTG, Time to Achieve by discharge  -      Toileting Goal OT LTG, [place] Level conditional independence  -AC      Functional Mobility OT LTG    Functional Mobility Goal OT LTG, Date Established 09/13/17  -      Functional Mobility Goal OT LTG, Time to Achieve by discharge  -      Functional Mobility Goal OT LTG, [place] Level supervision  -      Functional Mobility Goal OT LTG, Assist Device rolling walker  -      Functional Mobility Goal OT LTG, Distance to Achieve to the bathroom  -        User Key  (r) = Recorded By, (t) = Taken By, (c) = Cosigned By    Initials Name Provider Type      Clemencia Harper OT Occupational Therapist                Outcome Measures       09/13/17 1055          How much help from another is currently needed...    Putting on and taking off regular lower body clothing? 3  -AC      Bathing (including washing, rinsing, and drying) 2  -AC      Toileting (which includes using toilet bed pan or urinal) 2  -AC      Putting on and taking off regular upper body clothing 3  -AC      Taking care of personal grooming (such as brushing teeth) 4  -AC      Eating meals 4  -AC      Score 18  -AC      Functional Assessment    Outcome Measure Options AM-PAC 6 Clicks Daily Activity (OT)  -AC        User Key  (r) = Recorded By, (t) = Taken By, (c) = Cosigned By    Initials Name Provider Type    AC Clemnecia Harper OT Occupational Therapist          Time Calculation:   OT Start Time: 1055    Therapy Charges for Today     Code Description Service Date Service Provider Modifiers Qty    [id number]  OT EVAL LOW COMPLEXITY 4 9/13/2017 Clemencia Harper OT GO 1               Clemencia Harper OT  9/13/2017   with patient

## 2025-04-14 ENCOUNTER — DOCUMENTATION (OUTPATIENT)
Dept: FAMILY MEDICINE CLINIC | Facility: CLINIC | Age: 85
End: 2025-04-14
Payer: COMMERCIAL

## 2025-04-14 RX ORDER — SENNOSIDES 8.6 MG
650 CAPSULE ORAL EVERY 6 HOURS PRN
COMMUNITY

## 2025-04-14 RX ORDER — AMOXICILLIN 250 MG
1 CAPSULE ORAL DAILY
COMMUNITY

## 2025-04-14 RX ORDER — METHOCARBAMOL 500 MG/1
500 TABLET, FILM COATED ORAL 4 TIMES DAILY
COMMUNITY

## 2025-04-14 RX ORDER — BUPROPION HYDROCHLORIDE 150 MG/1
150 TABLET ORAL DAILY
COMMUNITY

## 2025-04-14 RX ORDER — METOPROLOL TARTRATE 25 MG/1
12.5 TABLET, FILM COATED ORAL 2 TIMES DAILY
COMMUNITY

## 2025-04-14 RX ORDER — OXYCODONE HYDROCHLORIDE 5 MG/1
5 TABLET ORAL EVERY 6 HOURS PRN
COMMUNITY

## 2025-04-14 RX ORDER — TROSPIUM CHLORIDE 20 MG/1
20 TABLET, FILM COATED ORAL DAILY
COMMUNITY

## 2025-04-14 RX ORDER — ATORVASTATIN CALCIUM 10 MG/1
10 TABLET, FILM COATED ORAL DAILY
COMMUNITY